# Patient Record
Sex: MALE | Race: WHITE | NOT HISPANIC OR LATINO | Employment: OTHER | URBAN - METROPOLITAN AREA
[De-identification: names, ages, dates, MRNs, and addresses within clinical notes are randomized per-mention and may not be internally consistent; named-entity substitution may affect disease eponyms.]

---

## 2022-07-18 NOTE — TELEPHONE ENCOUNTER
DIAGNOSIS: left knee pain   APPOINTMENT DATE: 07/19/2022   NOTES STATUS DETAILS   OFFICE NOTE from referring provider N/A    OFFICE NOTE from other specialist N/A    DISCHARGE SUMMARY from hospital N/A    DISCHARGE REPORT from the ER N/A    OPERATIVE REPORT N/A    EMG report N/A    MEDICATION LIST N/A    MRI N/A    DEXA (osteoporosis/bone health) N/A    CT SCAN N/A    XRAYS (IMAGES & REPORTS) N/A

## 2022-07-19 ENCOUNTER — ANCILLARY PROCEDURE (OUTPATIENT)
Dept: GENERAL RADIOLOGY | Facility: CLINIC | Age: 42
End: 2022-07-19
Attending: PREVENTIVE MEDICINE
Payer: COMMERCIAL

## 2022-07-19 ENCOUNTER — PRE VISIT (OUTPATIENT)
Dept: ORTHOPEDICS | Facility: CLINIC | Age: 42
End: 2022-07-19

## 2022-07-19 ENCOUNTER — OFFICE VISIT (OUTPATIENT)
Dept: ORTHOPEDICS | Facility: CLINIC | Age: 42
End: 2022-07-19
Payer: COMMERCIAL

## 2022-07-19 DIAGNOSIS — M54.16 LUMBAR RADICULAR PAIN: ICD-10-CM

## 2022-07-19 DIAGNOSIS — M25.562 LEFT KNEE PAIN: ICD-10-CM

## 2022-07-19 DIAGNOSIS — M17.12 ARTHRITIS OF LEFT KNEE: ICD-10-CM

## 2022-07-19 DIAGNOSIS — M25.562 LEFT KNEE PAIN, UNSPECIFIED CHRONICITY: Primary | ICD-10-CM

## 2022-07-19 PROCEDURE — 73562 X-RAY EXAM OF KNEE 3: CPT | Mod: LT | Performed by: RADIOLOGY

## 2022-07-19 PROCEDURE — 99204 OFFICE O/P NEW MOD 45 MIN: CPT | Performed by: PREVENTIVE MEDICINE

## 2022-07-19 NOTE — LETTER
"    7/19/2022         RE: David Segura  [address] [facility] [address]        Dear Colleague,    Thank you for referring your patient, David Segura, to the [facility] [place]. Please see a copy of my visit note below.    HISTORY OF PRESENT ILLNESS  Mr. Segura is a pleasant 42 year old year old male who presents to clinic today with   David explains that he feels like his knee is \"pulling\" when he flexes it and it has been painful for him  Location: left knee 3  Quality:  achy pain    Severity: 7/10 at worst        MEDICAL HISTORY  There is no problem list on file for this patient.      Current Outpatient Medications   Medication Sig Dispense Refill     ASPIRIN PO Take 324 mg by mouth         No Known Allergies    Family History   Problem Relation Age of Onset     C.A.D. Paternal Grandfather         MI age 50s     Prostate Cancer Paternal Grandfather         ?     Prostate Cancer Maternal Grandfather      Cancer - colorectal No family hx of      Social History     Socioeconomic History     Marital status:      Number of children: 1   Occupational History     Employer: [employer]   Tobacco Use     Smoking status: Never Smoker   Substance and Sexual Activity     Alcohol use: No     Drug use: No     Sexual activity: Yes     Partners: Female       Additional medical/Social/Surgical histories reviewed in [employer] and updated as appropriate.     REVIEW OF SYSTEMS (7/19/2022)  10 point ROS of systems including Constitutional, Eyes, Respiratory, Cardiovascular, Gastroenterology, Genitourinary, Integumentary, Musculoskeletal, Psychiatric, Allergic/Immunologic were all negative except for pertinent positives noted in my HPI.     PHYSICAL EXAM  There were no vitals filed for this visit.    ASSESSMENT & PLAN      I independently reviewed the following imaging studies:    Appropriate PPE was utilized for prevention of spread of Covid-19.  Carlos Garcia MD, " CAM          Again, thank you for allowing me to participate in the care of your patient.        Sincerely,        Carlos Garcia MD

## 2022-07-19 NOTE — PROGRESS NOTES
"HISTORY OF PRESENT ILLNESS  Mr. Segura is a pleasant 42 year old year old male who presents to clinic today with   David explains that he feels like his knee is \"pulling\" when he flexes it and it has been painful for him  Location: left knee 3  Quality:  achy pain    Severity: 7/10 at worst        MEDICAL HISTORY  There is no problem list on file for this patient.      Current Outpatient Medications   Medication Sig Dispense Refill     ASPIRIN PO Take 324 mg by mouth         No Known Allergies    Family History   Problem Relation Age of Onset     C.A.D. Paternal Grandfather         MI age 50s     Prostate Cancer Paternal Grandfather         ?     Prostate Cancer Maternal Grandfather      Cancer - colorectal No family hx of      Social History     Socioeconomic History     Marital status:      Number of children: 1   Occupational History     Employer: SELF   Tobacco Use     Smoking status: Never Smoker   Substance and Sexual Activity     Alcohol use: No     Drug use: No     Sexual activity: Yes     Partners: Female       Additional medical/Social/Surgical histories reviewed in [facility] and updated as appropriate.     REVIEW OF SYSTEMS (7/19/2022)  10 point ROS of systems including Constitutional, Eyes, Respiratory, Cardiovascular, Gastroenterology, Genitourinary, Integumentary, Musculoskeletal, Psychiatric, Allergic/Immunologic were all negative except for pertinent positives noted in my HPI.     PHYSICAL EXAM  VSS  General  - normal appearance, in no obvious distress  HEENT  - conjunctivae not injected, moist mucous membranes, normocephalic/atraumatic head, ears normal appearance, no lesions, mouth normal appearance, no scars, normal dentition and teeth present  CV  - normal peripheral perfusion  Pulm  - normal respiratory pattern, non-labored  Musculoskeletal - lumbar spine  - stance: normal gait without limp, no obvious leg length discrepancy, normal heel and toe walk  - inspection: normal bone and joint " alignment, no obvious scoliosis  - palpation: no paravertebral or bony tenderness  - ROM: flexion only slightly exacerbates pain, normal extension, sidebending, rotation  - strength: lower extremities 5/5 in all planes  - special tests:  (-) straight leg raise  (-) slump test  Neuro  - patellar and Achilles DTRs 2+ bilaterally, no lower extremity sensory deficit throughout L5 distribution, grossly normal coordination, normal muscle tone  Skin  - no ecchymosis, erythema, warmth, or induration, no obvious rash  Psych  - interactive, appropriate, normal mood and affect  Left knee: shows some pain with PF compression, no joint line ttp  ASSESSMENT & PLAN  43 yo male with left knee with some arthritis PF arthritis, and some lumbar disc space narrowing with some radicular pain    I independently reviewed the following imaging studies:  Lumbar xrays: shows some ddd  xrays knee shows some arthritis  Consider injection  Given HEP  nsaids PRN  F/u 1-2months   Appropriate PPE was utilized for prevention of spread of Covid-19.  Carlos Garcia MD, CAQSM

## 2022-08-05 ENCOUNTER — OFFICE VISIT (OUTPATIENT)
Dept: FAMILY MEDICINE | Facility: OTHER | Age: 42
End: 2022-08-05
Payer: COMMERCIAL

## 2022-08-05 VITALS
HEIGHT: 71 IN | RESPIRATION RATE: 16 BRPM | HEART RATE: 60 BPM | TEMPERATURE: 97.6 F | WEIGHT: 212 LBS | OXYGEN SATURATION: 96 % | BODY MASS INDEX: 29.68 KG/M2

## 2022-08-05 DIAGNOSIS — G89.29 CHRONIC LEFT SHOULDER PAIN: ICD-10-CM

## 2022-08-05 DIAGNOSIS — Z13.220 SCREENING FOR HYPERLIPIDEMIA: ICD-10-CM

## 2022-08-05 DIAGNOSIS — Z00.00 HEALTH MAINTENANCE EXAMINATION: Primary | ICD-10-CM

## 2022-08-05 DIAGNOSIS — Z11.4 SCREENING FOR HIV (HUMAN IMMUNODEFICIENCY VIRUS): ICD-10-CM

## 2022-08-05 DIAGNOSIS — M25.512 CHRONIC LEFT SHOULDER PAIN: ICD-10-CM

## 2022-08-05 DIAGNOSIS — G47.33 OSA (OBSTRUCTIVE SLEEP APNEA): ICD-10-CM

## 2022-08-05 DIAGNOSIS — E78.2 MIXED HYPERLIPIDEMIA: ICD-10-CM

## 2022-08-05 DIAGNOSIS — Z11.59 NEED FOR HEPATITIS C SCREENING TEST: ICD-10-CM

## 2022-08-05 DIAGNOSIS — R73.03 PREDIABETES: ICD-10-CM

## 2022-08-05 LAB
CHOLEST SERPL-MCNC: 223 MG/DL
FASTING STATUS PATIENT QL REPORTED: YES
HBA1C MFR BLD: 5.6 % (ref 0–5.6)
HCV AB SERPL QL IA: NONREACTIVE
HDLC SERPL-MCNC: 40 MG/DL
HIV 1+2 AB+HIV1 P24 AG SERPL QL IA: NONREACTIVE
LDLC SERPL CALC-MCNC: 138 MG/DL
NONHDLC SERPL-MCNC: 183 MG/DL
TRIGL SERPL-MCNC: 227 MG/DL

## 2022-08-05 PROCEDURE — 87389 HIV-1 AG W/HIV-1&-2 AB AG IA: CPT | Performed by: STUDENT IN AN ORGANIZED HEALTH CARE EDUCATION/TRAINING PROGRAM

## 2022-08-05 PROCEDURE — 83036 HEMOGLOBIN GLYCOSYLATED A1C: CPT | Performed by: STUDENT IN AN ORGANIZED HEALTH CARE EDUCATION/TRAINING PROGRAM

## 2022-08-05 PROCEDURE — 99204 OFFICE O/P NEW MOD 45 MIN: CPT | Performed by: STUDENT IN AN ORGANIZED HEALTH CARE EDUCATION/TRAINING PROGRAM

## 2022-08-05 PROCEDURE — 86803 HEPATITIS C AB TEST: CPT | Performed by: STUDENT IN AN ORGANIZED HEALTH CARE EDUCATION/TRAINING PROGRAM

## 2022-08-05 PROCEDURE — 80061 LIPID PANEL: CPT | Performed by: STUDENT IN AN ORGANIZED HEALTH CARE EDUCATION/TRAINING PROGRAM

## 2022-08-05 PROCEDURE — 36415 COLL VENOUS BLD VENIPUNCTURE: CPT | Performed by: STUDENT IN AN ORGANIZED HEALTH CARE EDUCATION/TRAINING PROGRAM

## 2022-08-05 ASSESSMENT — PAIN SCALES - GENERAL: PAINLEVEL: NO PAIN (0)

## 2022-08-05 NOTE — PROGRESS NOTES
"    Assessment & Plan     Health maintenance examination  Screening for HIV (human immunodeficiency virus)  - HIV Antigen Antibody Combo  Need for hepatitis C screening test  - Hepatitis C Screen Reflex to HCV RNA Quant and Genotype  Screening for hyperlipidemia  Baseline labs ordered today.  Health maintenance reviewed.  No early colon cancer screening required.  Healthy lifestyle measures were discussed    POPEYE (obstructive sleep apnea)  Stable on CPAP    Mixed hyperlipidemia  - Lipid panel reflex to direct LDL Fasting    Chronic left shoulder pain  Continue with chiropractic follow-up, massage, and provided exercises today.  He does wish to visit with physical therapy which I think is fair.  I do think his trapezius muscles do tense up especially at the end of the day, patient is very busy and is very active.  - Physical Therapy Referral; Future    Prediabetes  Previously 5.71-year ago  - Hemoglobin A1c    I have spent 45 or more minutes face-to-face with the patient and coordinating care such as reviewing documentation, results, or having discussions over the phone.      MARISELA BRADFORD MD  [facility] REBECCA Hernandez is a 42 year old, presenting for the following health issues:  Establish Care      History of Present Illness       Reason for visit:  Check up    He eats 2-3 servings of fruits and vegetables daily.He consumes 0 sweetened beverage(s) daily.He exercises with enough effort to increase his heart rate 10 to 19 minutes per day.  He exercises with enough effort to increase his heart rate 3 or less days per week.   He is taking medications regularly.       Blood work       Review of Systems   Constitutional, HEENT, cardiovascular, pulmonary, gi and gu systems are negative, except as otherwise noted.      Objective    Pulse 60   Temp 97.6  F (36.4  C) (Temporal)   Resp 16   Ht 1.803 m (5' 11\")   Wt 96.2 kg (212 lb)   SpO2 96%   BMI 29.57 kg/m    Body mass index is " 29.57 kg/m .  Physical Exam  Vitals and nursing note reviewed.   Constitutional:       General: He is not in acute distress.     Appearance: Normal appearance. He is not ill-appearing, toxic-appearing or diaphoretic.   HENT:      Head: Normocephalic and atraumatic.      Right Ear: Tympanic membrane, ear canal and external ear normal. There is no impacted cerumen.      Left Ear: Tympanic membrane, ear canal and external ear normal. There is no impacted cerumen.      Nose: Nose normal. No congestion or rhinorrhea.      Mouth/Throat:      Mouth: Mucous membranes are moist.      Pharynx: Oropharynx is clear. No oropharyngeal exudate or posterior oropharyngeal erythema.   Eyes:      General: No scleral icterus.        Right eye: No discharge.         Left eye: No discharge.      Extraocular Movements: Extraocular movements intact.      Conjunctiva/sclera: Conjunctivae normal.      Pupils: Pupils are equal, round, and reactive to light.   Cardiovascular:      Rate and Rhythm: Normal rate and regular rhythm.      Heart sounds: No murmur heard.  Pulmonary:      Effort: Pulmonary effort is normal. No respiratory distress.      Breath sounds: Normal breath sounds. No stridor.   Abdominal:      General: There is no distension.      Palpations: Abdomen is soft.      Tenderness: There is no abdominal tenderness.      Hernia: No hernia is present.      Comments: Healed surgical abdominal scars   Musculoskeletal:         General: Normal range of motion.      Cervical back: Normal range of motion.      Right lower leg: No edema.      Left lower leg: No edema.   Lymphadenopathy:      Cervical: No cervical adenopathy.   Skin:     General: Skin is warm.   Neurological:      Mental Status: He is alert.   Psychiatric:         Mood and Affect: Mood normal.         Behavior: Behavior normal.         Thought Content: Thought content normal.         Judgment: Judgment normal.                      .  ..

## 2022-08-05 NOTE — PATIENT INSTRUCTIONS
TOP FIVE THINGS FOR HEALTHY LIVING:    -Keep active and moving in some way EVERY DAY (even a simple walk).  -Eat a healthy diet mainly plant based diet (80%) with lots of colors in your food (example: vegetable salad).  -Take some time for yourself every day to relax in some way (read a book).  -Keep a consistent bedtime as much as possible.  -Wear seatbelts and/or helmet every time you are driving or riding in a vehicle/ATV/motorcycle.           Preventive Health Recommendations  Male Ages 40 to 49    Yearly exam:             See your health care provider every year in order to  o   Review health changes.   o   Discuss preventive care.    o   Review your medicines if your doctor has prescribed any.  You should be tested each year for STDs (sexually transmitted diseases) if you re at risk.   Have a cholesterol test every 5 years.   Have a colonoscopy (test for colon cancer) if someone in your family has had colon cancer or polyps before age 50.   After age 45, have a diabetes test (fasting glucose). If you are at risk for diabetes, you should have this test every 3 years.    Talk with your health care provider about whether or not a prostate cancer screening test (PSA) is right for you.    Shots: Get a flu shot each year. Get a tetanus shot every 10 years.     Nutrition:  Eat at least 5 servings of fruits and vegetables daily.   Eat whole-grain bread, whole-wheat pasta and brown rice instead of white grains and rice.   Get adequate Calcium and Vitamin D.     Lifestyle  Exercise for at least 150 minutes a week (30 minutes a day, 5 days a week). This will help you control your weight and prevent disease.   Limit alcohol to one drink per day.   No smoking.   Wear sunscreen to prevent skin cancer.   See your dentist every six months for an exam and cleaning.

## 2022-08-21 ENCOUNTER — HEALTH MAINTENANCE LETTER (OUTPATIENT)
Age: 42
End: 2022-08-21

## 2022-11-21 ENCOUNTER — HEALTH MAINTENANCE LETTER (OUTPATIENT)
Age: 42
End: 2022-11-21

## 2023-09-17 ENCOUNTER — HEALTH MAINTENANCE LETTER (OUTPATIENT)
Age: 43
End: 2023-09-17

## 2024-11-10 ENCOUNTER — HEALTH MAINTENANCE LETTER (OUTPATIENT)
Age: 44
End: 2024-11-10

## 2025-02-11 ENCOUNTER — ANESTHESIA EVENT (OUTPATIENT)
Dept: SURGERY | Facility: CLINIC | Age: 45
End: 2025-02-11
Payer: COMMERCIAL

## 2025-02-11 ENCOUNTER — APPOINTMENT (OUTPATIENT)
Dept: GENERAL RADIOLOGY | Facility: CLINIC | Age: 45
End: 2025-02-11
Attending: STUDENT IN AN ORGANIZED HEALTH CARE EDUCATION/TRAINING PROGRAM
Payer: COMMERCIAL

## 2025-02-11 ENCOUNTER — APPOINTMENT (OUTPATIENT)
Dept: CT IMAGING | Facility: CLINIC | Age: 45
End: 2025-02-11
Attending: STUDENT IN AN ORGANIZED HEALTH CARE EDUCATION/TRAINING PROGRAM
Payer: COMMERCIAL

## 2025-02-11 ENCOUNTER — APPOINTMENT (OUTPATIENT)
Dept: GENERAL RADIOLOGY | Facility: CLINIC | Age: 45
End: 2025-02-11
Attending: ORTHOPAEDIC SURGERY
Payer: COMMERCIAL

## 2025-02-11 ENCOUNTER — TELEPHONE (OUTPATIENT)
Dept: ORTHOPEDICS | Facility: CLINIC | Age: 45
End: 2025-02-11
Payer: COMMERCIAL

## 2025-02-11 ENCOUNTER — ANESTHESIA (OUTPATIENT)
Dept: SURGERY | Facility: CLINIC | Age: 45
End: 2025-02-11
Payer: COMMERCIAL

## 2025-02-11 ENCOUNTER — HOSPITAL ENCOUNTER (OUTPATIENT)
Facility: CLINIC | Age: 45
Discharge: HOME | End: 2025-02-12
Attending: STUDENT IN AN ORGANIZED HEALTH CARE EDUCATION/TRAINING PROGRAM | Admitting: STUDENT IN AN ORGANIZED HEALTH CARE EDUCATION/TRAINING PROGRAM
Payer: COMMERCIAL

## 2025-02-11 DIAGNOSIS — S82.891A CLOSED FRACTURE OF RIGHT ANKLE, INITIAL ENCOUNTER: ICD-10-CM

## 2025-02-11 DIAGNOSIS — S82.891A CLOSED FRACTURE DISLOCATION OF RIGHT ANKLE, INITIAL ENCOUNTER: ICD-10-CM

## 2025-02-11 DIAGNOSIS — Z98.890 HISTORY OF ANKLE SURGERY: Primary | ICD-10-CM

## 2025-02-11 LAB
ANION GAP SERPL CALCULATED.3IONS-SCNC: 11 MMOL/L (ref 7–15)
ANION GAP SERPL CALCULATED.3IONS-SCNC: 9 MMOL/L (ref 7–15)
ATRIAL RATE - MUSE: 62 BPM
BASOPHILS # BLD AUTO: 0 10E3/UL (ref 0–0.2)
BASOPHILS NFR BLD AUTO: 0 %
BUN SERPL-MCNC: 17.5 MG/DL (ref 6–20)
BUN SERPL-MCNC: 22.3 MG/DL (ref 6–20)
CALCIUM SERPL-MCNC: 8.4 MG/DL (ref 8.8–10.4)
CALCIUM SERPL-MCNC: 8.4 MG/DL (ref 8.8–10.4)
CHLORIDE SERPL-SCNC: 103 MMOL/L (ref 98–107)
CHLORIDE SERPL-SCNC: 104 MMOL/L (ref 98–107)
CREAT SERPL-MCNC: 0.85 MG/DL (ref 0.67–1.17)
CREAT SERPL-MCNC: 0.99 MG/DL (ref 0.67–1.17)
DIASTOLIC BLOOD PRESSURE - MUSE: NORMAL MMHG
EGFRCR SERPLBLD CKD-EPI 2021: >90 ML/MIN/1.73M2
EGFRCR SERPLBLD CKD-EPI 2021: >90 ML/MIN/1.73M2
EOSINOPHIL # BLD AUTO: 0 10E3/UL (ref 0–0.7)
EOSINOPHIL NFR BLD AUTO: 0 %
ERYTHROCYTE [DISTWIDTH] IN BLOOD BY AUTOMATED COUNT: 13 % (ref 10–15)
GLUCOSE SERPL-MCNC: 119 MG/DL (ref 70–99)
GLUCOSE SERPL-MCNC: 119 MG/DL (ref 70–99)
HCO3 SERPL-SCNC: 23 MMOL/L (ref 22–29)
HCO3 SERPL-SCNC: 24 MMOL/L (ref 22–29)
HCT VFR BLD AUTO: 38.8 % (ref 40–53)
HGB BLD-MCNC: 13.1 G/DL (ref 13.3–17.7)
IMM GRANULOCYTES # BLD: 0 10E3/UL
IMM GRANULOCYTES NFR BLD: 0 %
INTERPRETATION ECG - MUSE: NORMAL
LYMPHOCYTES # BLD AUTO: 0.7 10E3/UL (ref 0.8–5.3)
LYMPHOCYTES NFR BLD AUTO: 7 %
MCH RBC QN AUTO: 29.8 PG (ref 26.5–33)
MCHC RBC AUTO-ENTMCNC: 33.8 G/DL (ref 31.5–36.5)
MCV RBC AUTO: 88 FL (ref 78–100)
MONOCYTES # BLD AUTO: 0.7 10E3/UL (ref 0–1.3)
MONOCYTES NFR BLD AUTO: 6 %
NEUTROPHILS # BLD AUTO: 9.3 10E3/UL (ref 1.6–8.3)
NEUTROPHILS NFR BLD AUTO: 86 %
NRBC # BLD AUTO: 0 10E3/UL
NRBC BLD AUTO-RTO: 0 /100
P AXIS - MUSE: 50 DEGREES
PLATELET # BLD AUTO: 233 10E3/UL (ref 150–450)
POTASSIUM SERPL-SCNC: 4.4 MMOL/L (ref 3.4–5.3)
POTASSIUM SERPL-SCNC: 4.8 MMOL/L (ref 3.4–5.3)
PR INTERVAL - MUSE: 148 MS
QRS DURATION - MUSE: 90 MS
QT - MUSE: 418 MS
QTC - MUSE: 424 MS
R AXIS - MUSE: 105 DEGREES
RBC # BLD AUTO: 4.39 10E6/UL (ref 4.4–5.9)
SODIUM SERPL-SCNC: 137 MMOL/L (ref 135–145)
SODIUM SERPL-SCNC: 137 MMOL/L (ref 135–145)
SYSTOLIC BLOOD PRESSURE - MUSE: NORMAL MMHG
T AXIS - MUSE: 43 DEGREES
VENTRICULAR RATE- MUSE: 62 BPM
WBC # BLD AUTO: 10.8 10E3/UL (ref 4–11)

## 2025-02-11 PROCEDURE — 250N000011 HC RX IP 250 OP 636: Mod: JZ | Performed by: STUDENT IN AN ORGANIZED HEALTH CARE EDUCATION/TRAINING PROGRAM

## 2025-02-11 PROCEDURE — 999N000065 XR ANKLE RIGHT 1 VIEW: Mod: RT,52

## 2025-02-11 PROCEDURE — G0378 HOSPITAL OBSERVATION PER HR: HCPCS

## 2025-02-11 PROCEDURE — C1769 GUIDE WIRE: HCPCS | Performed by: ORTHOPAEDIC SURGERY

## 2025-02-11 PROCEDURE — 85049 AUTOMATED PLATELET COUNT: CPT | Performed by: STUDENT IN AN ORGANIZED HEALTH CARE EDUCATION/TRAINING PROGRAM

## 2025-02-11 PROCEDURE — 258N000003 HC RX IP 258 OP 636: Performed by: NURSE ANESTHETIST, CERTIFIED REGISTERED

## 2025-02-11 PROCEDURE — C1713 ANCHOR/SCREW BN/BN,TIS/BN: HCPCS | Performed by: ORTHOPAEDIC SURGERY

## 2025-02-11 PROCEDURE — 360N000084 HC SURGERY LEVEL 4 W/ FLUORO, PER MIN: Performed by: ORTHOPAEDIC SURGERY

## 2025-02-11 PROCEDURE — 250N000013 HC RX MED GY IP 250 OP 250 PS 637: Performed by: ORTHOPAEDIC SURGERY

## 2025-02-11 PROCEDURE — 99292 CRITICAL CARE ADDL 30 MIN: CPT

## 2025-02-11 PROCEDURE — 250N000025 HC SEVOFLURANE, PER MIN: Performed by: ORTHOPAEDIC SURGERY

## 2025-02-11 PROCEDURE — 250N000009 HC RX 250: Performed by: NURSE ANESTHETIST, CERTIFIED REGISTERED

## 2025-02-11 PROCEDURE — 710N000010 HC RECOVERY PHASE 1, LEVEL 2, PER MIN: Performed by: ORTHOPAEDIC SURGERY

## 2025-02-11 PROCEDURE — 36415 COLL VENOUS BLD VENIPUNCTURE: CPT | Performed by: STUDENT IN AN ORGANIZED HEALTH CARE EDUCATION/TRAINING PROGRAM

## 2025-02-11 PROCEDURE — 96376 TX/PRO/DX INJ SAME DRUG ADON: CPT | Mod: 59

## 2025-02-11 PROCEDURE — 999N000157 HC STATISTIC RCP TIME EA 10 MIN

## 2025-02-11 PROCEDURE — 73700 CT LOWER EXTREMITY W/O DYE: CPT | Mod: RT

## 2025-02-11 PROCEDURE — 93005 ELECTROCARDIOGRAM TRACING: CPT

## 2025-02-11 PROCEDURE — 96375 TX/PRO/DX INJ NEW DRUG ADDON: CPT

## 2025-02-11 PROCEDURE — 99291 CRITICAL CARE FIRST HOUR: CPT | Mod: 25

## 2025-02-11 PROCEDURE — 258N000003 HC RX IP 258 OP 636: Performed by: PHYSICIAN ASSISTANT

## 2025-02-11 PROCEDURE — 99285 EMERGENCY DEPT VISIT HI MDM: CPT | Mod: 25 | Performed by: STUDENT IN AN ORGANIZED HEALTH CARE EDUCATION/TRAINING PROGRAM

## 2025-02-11 PROCEDURE — 82565 ASSAY OF CREATININE: CPT | Performed by: PHYSICIAN ASSISTANT

## 2025-02-11 PROCEDURE — 272N000001 HC OR GENERAL SUPPLY STERILE: Performed by: ORTHOPAEDIC SURGERY

## 2025-02-11 PROCEDURE — 271N000001 HC OR GENERAL SUPPLY NON-STERILE: Performed by: ORTHOPAEDIC SURGERY

## 2025-02-11 PROCEDURE — 36415 COLL VENOUS BLD VENIPUNCTURE: CPT | Performed by: PHYSICIAN ASSISTANT

## 2025-02-11 PROCEDURE — 999N000054 HC STATISTIC EKG NON-CHARGEABLE

## 2025-02-11 PROCEDURE — 250N000011 HC RX IP 250 OP 636: Performed by: NURSE ANESTHETIST, CERTIFIED REGISTERED

## 2025-02-11 PROCEDURE — 99205 OFFICE O/P NEW HI 60 MIN: CPT | Performed by: STUDENT IN AN ORGANIZED HEALTH CARE EDUCATION/TRAINING PROGRAM

## 2025-02-11 PROCEDURE — 250N000011 HC RX IP 250 OP 636: Performed by: ORTHOPAEDIC SURGERY

## 2025-02-11 PROCEDURE — 999N000179 XR SURGERY CARM FLUORO LESS THAN 5 MIN W STILLS

## 2025-02-11 PROCEDURE — 370N000017 HC ANESTHESIA TECHNICAL FEE, PER MIN: Performed by: ORTHOPAEDIC SURGERY

## 2025-02-11 PROCEDURE — 73600 X-RAY EXAM OF ANKLE: CPT | Mod: RT

## 2025-02-11 PROCEDURE — 250N000011 HC RX IP 250 OP 636: Performed by: PHYSICIAN ASSISTANT

## 2025-02-11 PROCEDURE — 82565 ASSAY OF CREATININE: CPT | Performed by: STUDENT IN AN ORGANIZED HEALTH CARE EDUCATION/TRAINING PROGRAM

## 2025-02-11 PROCEDURE — 250N000013 HC RX MED GY IP 250 OP 250 PS 637: Performed by: PHYSICIAN ASSISTANT

## 2025-02-11 PROCEDURE — 999N000065 XR ANKLE RIGHT G/E 3 VIEWS: Mod: RT

## 2025-02-11 PROCEDURE — 96374 THER/PROPH/DIAG INJ IV PUSH: CPT | Mod: 59

## 2025-02-11 PROCEDURE — 999N000141 HC STATISTIC PRE-PROCEDURE NURSING ASSESSMENT: Performed by: ORTHOPAEDIC SURGERY

## 2025-02-11 DEVICE — IMP SCR SYN CORTEX 3.5X12MM SELF TAP SS 204.812: Type: IMPLANTABLE DEVICE | Site: ANKLE | Status: FUNCTIONAL

## 2025-02-11 DEVICE — IMP SCR SYN CORTEX 3.5X22MM SELF TAP SS 204.822: Type: IMPLANTABLE DEVICE | Site: ANKLE | Status: FUNCTIONAL

## 2025-02-11 DEVICE — IMP SCR SYN CORTEX 3.5X16MM SELF TAP SS 204.816: Type: IMPLANTABLE DEVICE | Site: ANKLE | Status: FUNCTIONAL

## 2025-02-11 DEVICE — IMP SCR SYN CAN 4.0X50MM SHORT SS 207.650: Type: IMPLANTABLE DEVICE | Site: ANKLE | Status: FUNCTIONAL

## 2025-02-11 DEVICE — IMP PLATE SYN 1/3 TUBULAR 73MM 06H SS 241.36: Type: IMPLANTABLE DEVICE | Site: ANKLE | Status: FUNCTIONAL

## 2025-02-11 DEVICE — IMP WASHER SYN CAN SM 7.0MM 219.98: Type: IMPLANTABLE DEVICE | Site: ANKLE | Status: FUNCTIONAL

## 2025-02-11 DEVICE — IMP SCR SYN CORTEX 3.5X20MM SELF TAP SS 204.820: Type: IMPLANTABLE DEVICE | Site: ANKLE | Status: FUNCTIONAL

## 2025-02-11 DEVICE — IMP SCR SYN CORTEX 3.5X14MM SELF TAP SS 204.814: Type: IMPLANTABLE DEVICE | Site: ANKLE | Status: FUNCTIONAL

## 2025-02-11 DEVICE — IMP SCR SYN CAN 4.0X30MM LONG THRD SS 207.730: Type: IMPLANTABLE DEVICE | Site: ANKLE | Status: FUNCTIONAL

## 2025-02-11 RX ORDER — AMOXICILLIN 250 MG
2 CAPSULE ORAL 2 TIMES DAILY PRN
Status: DISCONTINUED | OUTPATIENT
Start: 2025-02-11 | End: 2025-02-12 | Stop reason: HOSPADM

## 2025-02-11 RX ORDER — HYDROMORPHONE HYDROCHLORIDE 1 MG/ML
0.5 INJECTION, SOLUTION INTRAMUSCULAR; INTRAVENOUS; SUBCUTANEOUS ONCE
Status: COMPLETED | OUTPATIENT
Start: 2025-02-11 | End: 2025-02-11

## 2025-02-11 RX ORDER — NALOXONE HYDROCHLORIDE 0.4 MG/ML
0.4 INJECTION, SOLUTION INTRAMUSCULAR; INTRAVENOUS; SUBCUTANEOUS
Status: DISCONTINUED | OUTPATIENT
Start: 2025-02-11 | End: 2025-02-12 | Stop reason: HOSPADM

## 2025-02-11 RX ORDER — NALOXONE HYDROCHLORIDE 0.4 MG/ML
0.1 INJECTION, SOLUTION INTRAMUSCULAR; INTRAVENOUS; SUBCUTANEOUS
Status: DISCONTINUED | OUTPATIENT
Start: 2025-02-11 | End: 2025-02-11 | Stop reason: HOSPADM

## 2025-02-11 RX ORDER — CEFAZOLIN SODIUM/WATER 2 G/20 ML
2 SYRINGE (ML) INTRAVENOUS SEE ADMIN INSTRUCTIONS
Status: DISCONTINUED | OUTPATIENT
Start: 2025-02-11 | End: 2025-02-11

## 2025-02-11 RX ORDER — ALBUTEROL SULFATE 90 UG/1
2 INHALANT RESPIRATORY (INHALATION) EVERY 4 HOURS PRN
Status: DISCONTINUED | OUTPATIENT
Start: 2025-02-11 | End: 2025-02-12 | Stop reason: HOSPADM

## 2025-02-11 RX ORDER — AMOXICILLIN 250 MG
1 CAPSULE ORAL 2 TIMES DAILY
Status: DISCONTINUED | OUTPATIENT
Start: 2025-02-11 | End: 2025-02-12 | Stop reason: HOSPADM

## 2025-02-11 RX ORDER — GLYCOPYRROLATE 0.2 MG/ML
INJECTION, SOLUTION INTRAMUSCULAR; INTRAVENOUS PRN
Status: DISCONTINUED | OUTPATIENT
Start: 2025-02-11 | End: 2025-02-11

## 2025-02-11 RX ORDER — ACETAMINOPHEN 325 MG/1
650 TABLET ORAL EVERY 4 HOURS PRN
Qty: 100 TABLET | Refills: 0 | Status: SHIPPED | OUTPATIENT
Start: 2025-02-11

## 2025-02-11 RX ORDER — PROPOFOL 10 MG/ML
200 INJECTION, EMULSION INTRAVENOUS ONCE
Status: COMPLETED | OUTPATIENT
Start: 2025-02-11 | End: 2025-02-11

## 2025-02-11 RX ORDER — FENTANYL CITRATE-0.9 % NACL/PF 10 MCG/ML
PLASTIC BAG, INJECTION (ML) INTRAVENOUS CONTINUOUS PRN
Status: DISCONTINUED | OUTPATIENT
Start: 2025-02-11 | End: 2025-02-11

## 2025-02-11 RX ORDER — CEFAZOLIN SODIUM/WATER 2 G/20 ML
2 SYRINGE (ML) INTRAVENOUS
Status: COMPLETED | OUTPATIENT
Start: 2025-02-11 | End: 2025-02-11

## 2025-02-11 RX ORDER — OXYCODONE HYDROCHLORIDE 5 MG/1
10 TABLET ORAL EVERY 4 HOURS PRN
Status: DISCONTINUED | OUTPATIENT
Start: 2025-02-11 | End: 2025-02-12 | Stop reason: HOSPADM

## 2025-02-11 RX ORDER — HYDROMORPHONE HCL IN WATER/PF 6 MG/30 ML
0.4 PATIENT CONTROLLED ANALGESIA SYRINGE INTRAVENOUS EVERY 5 MIN PRN
Status: DISCONTINUED | OUTPATIENT
Start: 2025-02-11 | End: 2025-02-11 | Stop reason: HOSPADM

## 2025-02-11 RX ORDER — HYDROMORPHONE HYDROCHLORIDE 1 MG/ML
0.5 INJECTION, SOLUTION INTRAMUSCULAR; INTRAVENOUS; SUBCUTANEOUS EVERY 4 HOURS PRN
Status: DISCONTINUED | OUTPATIENT
Start: 2025-02-11 | End: 2025-02-11

## 2025-02-11 RX ORDER — ONDANSETRON 2 MG/ML
4 INJECTION INTRAMUSCULAR; INTRAVENOUS EVERY 6 HOURS PRN
Status: DISCONTINUED | OUTPATIENT
Start: 2025-02-11 | End: 2025-02-12 | Stop reason: HOSPADM

## 2025-02-11 RX ORDER — DEXAMETHASONE SODIUM PHOSPHATE 4 MG/ML
INJECTION, SOLUTION INTRA-ARTICULAR; INTRALESIONAL; INTRAMUSCULAR; INTRAVENOUS; SOFT TISSUE PRN
Status: DISCONTINUED | OUTPATIENT
Start: 2025-02-11 | End: 2025-02-11

## 2025-02-11 RX ORDER — CEFAZOLIN SODIUM 2 G/50ML
2 SOLUTION INTRAVENOUS EVERY 8 HOURS
Status: COMPLETED | OUTPATIENT
Start: 2025-02-12 | End: 2025-02-12

## 2025-02-11 RX ORDER — BISACODYL 10 MG
10 SUPPOSITORY, RECTAL RECTAL DAILY PRN
Status: DISCONTINUED | OUTPATIENT
Start: 2025-02-11 | End: 2025-02-12 | Stop reason: HOSPADM

## 2025-02-11 RX ORDER — ONDANSETRON 2 MG/ML
4 INJECTION INTRAMUSCULAR; INTRAVENOUS EVERY 30 MIN PRN
Status: DISCONTINUED | OUTPATIENT
Start: 2025-02-11 | End: 2025-02-11 | Stop reason: HOSPADM

## 2025-02-11 RX ORDER — HYDROMORPHONE HCL IN WATER/PF 6 MG/30 ML
0.2 PATIENT CONTROLLED ANALGESIA SYRINGE INTRAVENOUS EVERY 5 MIN PRN
Status: DISCONTINUED | OUTPATIENT
Start: 2025-02-11 | End: 2025-02-11 | Stop reason: HOSPADM

## 2025-02-11 RX ORDER — NALOXONE HYDROCHLORIDE 0.4 MG/ML
0.2 INJECTION, SOLUTION INTRAMUSCULAR; INTRAVENOUS; SUBCUTANEOUS
Status: DISCONTINUED | OUTPATIENT
Start: 2025-02-11 | End: 2025-02-12 | Stop reason: HOSPADM

## 2025-02-11 RX ORDER — ASPIRIN 325 MG
325 TABLET, DELAYED RELEASE (ENTERIC COATED) ORAL DAILY
Qty: 30 TABLET | Refills: 0 | Status: SHIPPED | OUTPATIENT
Start: 2025-02-11

## 2025-02-11 RX ORDER — ALBUTEROL SULFATE 90 UG/1
2 INHALANT RESPIRATORY (INHALATION) EVERY 4 HOURS PRN
COMMUNITY

## 2025-02-11 RX ORDER — HYDROMORPHONE HCL IN WATER/PF 6 MG/30 ML
0.2 PATIENT CONTROLLED ANALGESIA SYRINGE INTRAVENOUS
Status: DISCONTINUED | OUTPATIENT
Start: 2025-02-11 | End: 2025-02-11

## 2025-02-11 RX ORDER — SODIUM CHLORIDE, SODIUM LACTATE, POTASSIUM CHLORIDE, CALCIUM CHLORIDE 600; 310; 30; 20 MG/100ML; MG/100ML; MG/100ML; MG/100ML
INJECTION, SOLUTION INTRAVENOUS CONTINUOUS
Status: DISCONTINUED | OUTPATIENT
Start: 2025-02-11 | End: 2025-02-11 | Stop reason: HOSPADM

## 2025-02-11 RX ORDER — OXYCODONE HYDROCHLORIDE 5 MG/1
5 TABLET ORAL EVERY 4 HOURS PRN
Status: DISCONTINUED | OUTPATIENT
Start: 2025-02-11 | End: 2025-02-11

## 2025-02-11 RX ORDER — ASPIRIN 325 MG
325 TABLET, DELAYED RELEASE (ENTERIC COATED) ORAL DAILY
Status: DISCONTINUED | OUTPATIENT
Start: 2025-02-12 | End: 2025-02-12 | Stop reason: HOSPADM

## 2025-02-11 RX ORDER — PROPOFOL 10 MG/ML
INJECTION, EMULSION INTRAVENOUS PRN
Status: DISCONTINUED | OUTPATIENT
Start: 2025-02-11 | End: 2025-02-11

## 2025-02-11 RX ORDER — LIDOCAINE HYDROCHLORIDE 20 MG/ML
INJECTION, SOLUTION INFILTRATION; PERINEURAL PRN
Status: DISCONTINUED | OUTPATIENT
Start: 2025-02-11 | End: 2025-02-11

## 2025-02-11 RX ORDER — BUPIVACAINE HYDROCHLORIDE 2.5 MG/ML
INJECTION, SOLUTION EPIDURAL; INFILTRATION; INTRACAUDAL PRN
Status: DISCONTINUED | OUTPATIENT
Start: 2025-02-11 | End: 2025-02-11

## 2025-02-11 RX ORDER — ACETAMINOPHEN 650 MG/1
650 SUPPOSITORY RECTAL EVERY 4 HOURS PRN
Status: DISCONTINUED | OUTPATIENT
Start: 2025-02-11 | End: 2025-02-12 | Stop reason: HOSPADM

## 2025-02-11 RX ORDER — POLYETHYLENE GLYCOL 3350 17 G/17G
17 POWDER, FOR SOLUTION ORAL DAILY
Status: DISCONTINUED | OUTPATIENT
Start: 2025-02-12 | End: 2025-02-12 | Stop reason: HOSPADM

## 2025-02-11 RX ORDER — AMOXICILLIN 250 MG
1-2 CAPSULE ORAL 2 TIMES DAILY
Qty: 30 TABLET | Refills: 1 | Status: SHIPPED | OUTPATIENT
Start: 2025-02-11

## 2025-02-11 RX ORDER — FENTANYL CITRATE 50 UG/ML
50 INJECTION, SOLUTION INTRAMUSCULAR; INTRAVENOUS EVERY 5 MIN PRN
Status: DISCONTINUED | OUTPATIENT
Start: 2025-02-11 | End: 2025-02-11 | Stop reason: HOSPADM

## 2025-02-11 RX ORDER — LIDOCAINE 40 MG/G
CREAM TOPICAL
Status: DISCONTINUED | OUTPATIENT
Start: 2025-02-11 | End: 2025-02-11

## 2025-02-11 RX ORDER — SODIUM CHLORIDE, SODIUM LACTATE, POTASSIUM CHLORIDE, CALCIUM CHLORIDE 600; 310; 30; 20 MG/100ML; MG/100ML; MG/100ML; MG/100ML
INJECTION, SOLUTION INTRAVENOUS CONTINUOUS
Status: DISCONTINUED | OUTPATIENT
Start: 2025-02-11 | End: 2025-02-12 | Stop reason: HOSPADM

## 2025-02-11 RX ORDER — ONDANSETRON 4 MG/1
4 TABLET, ORALLY DISINTEGRATING ORAL EVERY 6 HOURS PRN
Status: DISCONTINUED | OUTPATIENT
Start: 2025-02-11 | End: 2025-02-12 | Stop reason: HOSPADM

## 2025-02-11 RX ORDER — ONDANSETRON 4 MG/1
4 TABLET, ORALLY DISINTEGRATING ORAL EVERY 30 MIN PRN
Status: DISCONTINUED | OUTPATIENT
Start: 2025-02-11 | End: 2025-02-11 | Stop reason: HOSPADM

## 2025-02-11 RX ORDER — ACETAMINOPHEN 325 MG/1
975 TABLET ORAL EVERY 8 HOURS
Status: DISCONTINUED | OUTPATIENT
Start: 2025-02-12 | End: 2025-02-12 | Stop reason: HOSPADM

## 2025-02-11 RX ORDER — CEFAZOLIN SODIUM/WATER 2 G/20 ML
2 SYRINGE (ML) INTRAVENOUS
Status: DISCONTINUED | OUTPATIENT
Start: 2025-02-11 | End: 2025-02-11

## 2025-02-11 RX ORDER — ACETAMINOPHEN 325 MG/1
975 TABLET ORAL ONCE
Status: COMPLETED | OUTPATIENT
Start: 2025-02-11 | End: 2025-02-11

## 2025-02-11 RX ORDER — LIDOCAINE 40 MG/G
CREAM TOPICAL
Status: DISCONTINUED | OUTPATIENT
Start: 2025-02-11 | End: 2025-02-11 | Stop reason: HOSPADM

## 2025-02-11 RX ORDER — PROPOFOL 10 MG/ML
INJECTION, EMULSION INTRAVENOUS CONTINUOUS PRN
Status: DISCONTINUED | OUTPATIENT
Start: 2025-02-11 | End: 2025-02-11

## 2025-02-11 RX ORDER — CALCIUM CARBONATE 500 MG/1
1000 TABLET, CHEWABLE ORAL 4 TIMES DAILY PRN
Status: DISCONTINUED | OUTPATIENT
Start: 2025-02-11 | End: 2025-02-12 | Stop reason: HOSPADM

## 2025-02-11 RX ORDER — FENTANYL CITRATE 50 UG/ML
INJECTION, SOLUTION INTRAMUSCULAR; INTRAVENOUS PRN
Status: DISCONTINUED | OUTPATIENT
Start: 2025-02-11 | End: 2025-02-11

## 2025-02-11 RX ORDER — FENTANYL CITRATE 50 UG/ML
25 INJECTION, SOLUTION INTRAMUSCULAR; INTRAVENOUS EVERY 5 MIN PRN
Status: DISCONTINUED | OUTPATIENT
Start: 2025-02-11 | End: 2025-02-11 | Stop reason: HOSPADM

## 2025-02-11 RX ORDER — LIDOCAINE 40 MG/G
CREAM TOPICAL
Status: DISCONTINUED | OUTPATIENT
Start: 2025-02-11 | End: 2025-02-12 | Stop reason: HOSPADM

## 2025-02-11 RX ORDER — TRANEXAMIC ACID 650 MG/1
1950 TABLET ORAL ONCE
Status: COMPLETED | OUTPATIENT
Start: 2025-02-11 | End: 2025-02-11

## 2025-02-11 RX ORDER — HYDROMORPHONE HCL IN WATER/PF 6 MG/30 ML
0.4 PATIENT CONTROLLED ANALGESIA SYRINGE INTRAVENOUS
Status: DISCONTINUED | OUTPATIENT
Start: 2025-02-11 | End: 2025-02-11

## 2025-02-11 RX ORDER — AMOXICILLIN 250 MG
1 CAPSULE ORAL 2 TIMES DAILY PRN
Status: DISCONTINUED | OUTPATIENT
Start: 2025-02-11 | End: 2025-02-12 | Stop reason: HOSPADM

## 2025-02-11 RX ORDER — CEFAZOLIN SODIUM/WATER 2 G/20 ML
2 SYRINGE (ML) INTRAVENOUS SEE ADMIN INSTRUCTIONS
Status: DISCONTINUED | OUTPATIENT
Start: 2025-02-11 | End: 2025-02-11 | Stop reason: HOSPADM

## 2025-02-11 RX ORDER — CALCIUM CARBONATE 500 MG/1
1000 TABLET, CHEWABLE ORAL 4 TIMES DAILY PRN
Status: DISCONTINUED | OUTPATIENT
Start: 2025-02-11 | End: 2025-02-11

## 2025-02-11 RX ORDER — KETOROLAC TROMETHAMINE 15 MG/ML
15 INJECTION, SOLUTION INTRAMUSCULAR; INTRAVENOUS ONCE
Status: COMPLETED | OUTPATIENT
Start: 2025-02-11 | End: 2025-02-11

## 2025-02-11 RX ORDER — TRANEXAMIC ACID 650 MG/1
1950 TABLET ORAL ONCE
Status: DISCONTINUED | OUTPATIENT
Start: 2025-02-11 | End: 2025-02-11 | Stop reason: HOSPADM

## 2025-02-11 RX ORDER — HYDROMORPHONE HCL IN WATER/PF 6 MG/30 ML
0.2 PATIENT CONTROLLED ANALGESIA SYRINGE INTRAVENOUS
Status: DISCONTINUED | OUTPATIENT
Start: 2025-02-11 | End: 2025-02-12 | Stop reason: HOSPADM

## 2025-02-11 RX ORDER — HYDROMORPHONE HCL IN WATER/PF 6 MG/30 ML
0.4 PATIENT CONTROLLED ANALGESIA SYRINGE INTRAVENOUS
Status: DISCONTINUED | OUTPATIENT
Start: 2025-02-11 | End: 2025-02-12 | Stop reason: HOSPADM

## 2025-02-11 RX ORDER — BUPIVACAINE HYDROCHLORIDE AND EPINEPHRINE 5; 5 MG/ML; UG/ML
INJECTION, SOLUTION PERINEURAL PRN
Status: DISCONTINUED | OUTPATIENT
Start: 2025-02-11 | End: 2025-02-11

## 2025-02-11 RX ORDER — SODIUM CHLORIDE, SODIUM LACTATE, POTASSIUM CHLORIDE, CALCIUM CHLORIDE 600; 310; 30; 20 MG/100ML; MG/100ML; MG/100ML; MG/100ML
INJECTION, SOLUTION INTRAVENOUS CONTINUOUS PRN
Status: DISCONTINUED | OUTPATIENT
Start: 2025-02-11 | End: 2025-02-11

## 2025-02-11 RX ORDER — ACETAMINOPHEN 325 MG/1
650 TABLET ORAL EVERY 4 HOURS PRN
Status: DISCONTINUED | OUTPATIENT
Start: 2025-02-11 | End: 2025-02-12 | Stop reason: HOSPADM

## 2025-02-11 RX ORDER — OXYCODONE HYDROCHLORIDE 5 MG/1
5 TABLET ORAL EVERY 4 HOURS PRN
Status: DISCONTINUED | OUTPATIENT
Start: 2025-02-11 | End: 2025-02-12 | Stop reason: HOSPADM

## 2025-02-11 RX ORDER — HYDROXYZINE HYDROCHLORIDE 25 MG/1
25 TABLET, FILM COATED ORAL EVERY 6 HOURS PRN
Status: DISCONTINUED | OUTPATIENT
Start: 2025-02-11 | End: 2025-02-12 | Stop reason: HOSPADM

## 2025-02-11 RX ORDER — OXYCODONE HYDROCHLORIDE 5 MG/1
5-10 TABLET ORAL EVERY 4 HOURS PRN
Qty: 28 TABLET | Refills: 0 | Status: SHIPPED | OUTPATIENT
Start: 2025-02-11

## 2025-02-11 RX ORDER — CELECOXIB 100 MG/1
400 CAPSULE ORAL ONCE
Status: COMPLETED | OUTPATIENT
Start: 2025-02-11 | End: 2025-02-11

## 2025-02-11 RX ADMIN — Medication 2 G: at 17:09

## 2025-02-11 RX ADMIN — Medication 30 MCG/MIN: at 18:04

## 2025-02-11 RX ADMIN — TRANEXAMIC ACID 1950 MG: 650 TABLET ORAL at 15:48

## 2025-02-11 RX ADMIN — HYDROMORPHONE HYDROCHLORIDE 0.5 MG: 1 INJECTION, SOLUTION INTRAMUSCULAR; INTRAVENOUS; SUBCUTANEOUS at 07:45

## 2025-02-11 RX ADMIN — MIDAZOLAM 2 MG: 1 INJECTION INTRAMUSCULAR; INTRAVENOUS at 17:05

## 2025-02-11 RX ADMIN — SODIUM CHLORIDE, POTASSIUM CHLORIDE, SODIUM LACTATE AND CALCIUM CHLORIDE: 600; 310; 30; 20 INJECTION, SOLUTION INTRAVENOUS at 15:51

## 2025-02-11 RX ADMIN — PHENYLEPHRINE HYDROCHLORIDE 100 MCG: 10 INJECTION INTRAVENOUS at 17:22

## 2025-02-11 RX ADMIN — ROCURONIUM BROMIDE 50 MG: 50 INJECTION, SOLUTION INTRAVENOUS at 17:17

## 2025-02-11 RX ADMIN — PHENYLEPHRINE HYDROCHLORIDE 200 MCG: 10 INJECTION INTRAVENOUS at 17:28

## 2025-02-11 RX ADMIN — GLYCOPYRROLATE 0.2 MG: 0.2 INJECTION, SOLUTION INTRAMUSCULAR; INTRAVENOUS at 18:18

## 2025-02-11 RX ADMIN — PROPOFOL 90 MG: 10 INJECTION, EMULSION INTRAVENOUS at 09:10

## 2025-02-11 RX ADMIN — PROPOFOL 200 MG: 10 INJECTION, EMULSION INTRAVENOUS at 17:16

## 2025-02-11 RX ADMIN — DEXAMETHASONE SODIUM PHOSPHATE 4 MG: 4 INJECTION, SOLUTION INTRA-ARTICULAR; INTRALESIONAL; INTRAMUSCULAR; INTRAVENOUS; SOFT TISSUE at 17:46

## 2025-02-11 RX ADMIN — PHENYLEPHRINE HYDROCHLORIDE 200 MCG: 10 INJECTION INTRAVENOUS at 17:37

## 2025-02-11 RX ADMIN — SODIUM CHLORIDE, POTASSIUM CHLORIDE, SODIUM LACTATE AND CALCIUM CHLORIDE: 600; 310; 30; 20 INJECTION, SOLUTION INTRAVENOUS at 19:46

## 2025-02-11 RX ADMIN — FENTANYL CITRATE 50 MCG: 50 INJECTION INTRAMUSCULAR; INTRAVENOUS at 17:05

## 2025-02-11 RX ADMIN — HYDROMORPHONE HYDROCHLORIDE 0.5 MG: 1 INJECTION, SOLUTION INTRAMUSCULAR; INTRAVENOUS; SUBCUTANEOUS at 07:21

## 2025-02-11 RX ADMIN — LIDOCAINE HYDROCHLORIDE 50 MG: 20 INJECTION, SOLUTION INFILTRATION; PERINEURAL at 17:16

## 2025-02-11 RX ADMIN — KETOROLAC TROMETHAMINE 15 MG: 15 INJECTION, SOLUTION INTRAMUSCULAR; INTRAVENOUS at 07:50

## 2025-02-11 RX ADMIN — PROPOFOL 100 MCG/KG/MIN: 10 INJECTION, EMULSION INTRAVENOUS at 17:20

## 2025-02-11 RX ADMIN — CEFAZOLIN SODIUM 2 G: 2 SOLUTION INTRAVENOUS at 23:22

## 2025-02-11 RX ADMIN — BUPIVACAINE HYDROCHLORIDE 20 ML: 2.5 INJECTION, SOLUTION EPIDURAL; INFILTRATION; INTRACAUDAL at 20:05

## 2025-02-11 RX ADMIN — GLYCOPYRROLATE 0.2 MG: 0.2 INJECTION, SOLUTION INTRAMUSCULAR; INTRAVENOUS at 18:30

## 2025-02-11 RX ADMIN — BUPIVACAINE HYDROCHLORIDE AND EPINEPHRINE BITARTRATE 20 ML: 5; .005 INJECTION, SOLUTION PERINEURAL at 17:46

## 2025-02-11 RX ADMIN — DEXAMETHASONE SODIUM PHOSPHATE 4 MG: 4 INJECTION, SOLUTION INTRA-ARTICULAR; INTRALESIONAL; INTRAMUSCULAR; INTRAVENOUS; SOFT TISSUE at 20:05

## 2025-02-11 RX ADMIN — HYDROMORPHONE HYDROCHLORIDE 0.2 MG: 0.2 INJECTION, SOLUTION INTRAMUSCULAR; INTRAVENOUS; SUBCUTANEOUS at 13:08

## 2025-02-11 RX ADMIN — PHENYLEPHRINE HYDROCHLORIDE 100 MCG: 10 INJECTION INTRAVENOUS at 18:04

## 2025-02-11 RX ADMIN — ACETAMINOPHEN 975 MG: 325 TABLET ORAL at 15:47

## 2025-02-11 RX ADMIN — ACETAMINOPHEN 975 MG: 325 TABLET, FILM COATED ORAL at 23:21

## 2025-02-11 RX ADMIN — ONDANSETRON 4 MG: 2 INJECTION INTRAMUSCULAR; INTRAVENOUS at 19:34

## 2025-02-11 RX ADMIN — SODIUM CHLORIDE, POTASSIUM CHLORIDE, SODIUM LACTATE AND CALCIUM CHLORIDE: 600; 310; 30; 20 INJECTION, SOLUTION INTRAVENOUS at 21:39

## 2025-02-11 RX ADMIN — SODIUM CHLORIDE, POTASSIUM CHLORIDE, SODIUM LACTATE AND CALCIUM CHLORIDE: 600; 310; 30; 20 INJECTION, SOLUTION INTRAVENOUS at 17:09

## 2025-02-11 RX ADMIN — Medication 100 MG: at 20:06

## 2025-02-11 RX ADMIN — CELECOXIB 400 MG: 100 CAPSULE ORAL at 15:47

## 2025-02-11 RX ADMIN — PHENYLEPHRINE HYDROCHLORIDE 200 MCG: 10 INJECTION INTRAVENOUS at 17:47

## 2025-02-11 RX ADMIN — FENTANYL CITRATE 50 MCG: 50 INJECTION INTRAMUSCULAR; INTRAVENOUS at 20:06

## 2025-02-11 RX ADMIN — SENNOSIDES AND DOCUSATE SODIUM 1 TABLET: 50; 8.6 TABLET ORAL at 23:21

## 2025-02-11 RX ADMIN — PHENYLEPHRINE HYDROCHLORIDE 100 MCG: 10 INJECTION INTRAVENOUS at 17:34

## 2025-02-11 ASSESSMENT — ACTIVITIES OF DAILY LIVING (ADL)
ADLS_ACUITY_SCORE: 15
ADLS_ACUITY_SCORE: 20
ADLS_ACUITY_SCORE: 20
ADLS_ACUITY_SCORE: 15
ADLS_ACUITY_SCORE: 20
ADLS_ACUITY_SCORE: 41
ADLS_ACUITY_SCORE: 20
ADLS_ACUITY_SCORE: 20
ADLS_ACUITY_SCORE: 41
ADLS_ACUITY_SCORE: 20
ADLS_ACUITY_SCORE: 41
ADLS_ACUITY_SCORE: 41
ADLS_ACUITY_SCORE: 20
ADLS_ACUITY_SCORE: 20

## 2025-02-11 ASSESSMENT — COLUMBIA-SUICIDE SEVERITY RATING SCALE - C-SSRS
1. IN THE PAST MONTH, HAVE YOU WISHED YOU WERE DEAD OR WISHED YOU COULD GO TO SLEEP AND NOT WAKE UP?: NO
6. HAVE YOU EVER DONE ANYTHING, STARTED TO DO ANYTHING, OR PREPARED TO DO ANYTHING TO END YOUR LIFE?: NO
2. HAVE YOU ACTUALLY HAD ANY THOUGHTS OF KILLING YOURSELF IN THE PAST MONTH?: NO

## 2025-02-11 NOTE — ANESTHESIA PROCEDURE NOTES
Airway       Patient location during procedure: OR       Procedure Start/Stop Times: 2/11/2025 5:19 PM  Staff -        CRNA: Lucas Rocha APRN CRNA       Performed By: CRNA  Consent for Airway        Urgency: elective  Indications and Patient Condition       Indications for airway management: katelynn-procedural       Induction type:intravenous       Mask difficulty assessment: 1 - vent by mask    Final Airway Details       Final airway type: endotracheal airway       Successful airway: ETT - single  Endotracheal Airway Details        ETT size (mm): 7.5       Cuffed: yes       Successful intubation technique: video laryngoscopy       VL Blade Size: MAC 3       Grade View of Cords: 2       Adjucts: stylet       Position: Right       Measured from: lips       Secured at (cm): 22    Post intubation assessment        Placement verified by: capnometry, equal breath sounds and chest rise        Number of attempts at approach: 1       Number of other approaches attempted: 0       Secured with: plastic tape       Ease of procedure: easy       Dentition: Intact and Unchanged    Medication(s) Administered   Medication Administration Time: 2/11/2025 5:19 PM

## 2025-02-11 NOTE — MEDICATION SCRIBE - ADMISSION MEDICATION HISTORY
Medication Scribe Admission Medication History    Admission medication history is complete. The information provided in this note is only as accurate as the sources available at the time of the update.    Information Source(s): Patient and CareEverywhere/SureScripts via in-person    Pertinent Information: inpatient, wife present. Verified no use of pain pump, inhalers or prescription eye drops currently.    Changes made to PTA medication list:  Added: CPAP, albuterol inh  Deleted: aspirin  Changed: None    Allergies reviewed with patient and updates made in EHR: yes    Medication History Completed By: CARYL ROCA 2/11/2025 11:15 AM    PTA Med List   Medication Sig Note Last Dose/Taking    OTHER MEDICAL SUPPLIES at bedtime. 2/11/2025: Patient's own 2/10/2025 Bedtime    albuterol (PROAIR HFA/PROVENTIL HFA/VENTOLIN HFA) 108 (90 Base) MCG/ACT inhaler Inhale 2 puffs into the lungs every 4 hours as needed for shortness of breath, wheezing or cough. Only uses when sick  More than a month

## 2025-02-11 NOTE — ED PROVIDER NOTES
History     Chief Complaint   Patient presents with    Trauma     David Segura is a 44 year old male who presents the emergency department for right ankle pain after injury.  He slipped on the ice just prior to arrival, noted deformity.  He was not bleeding from anywhere.  No medications taken prior to arrival.  Did not hit his head.  This was an isolated injury.    Allergies:  No Known Allergies    Problem List:    Patient Active Problem List    Diagnosis Date Noted    Closed fracture of right ankle, initial encounter 02/11/2025     Priority: Medium    Closed fracture dislocation of right ankle, initial encounter 02/11/2025     Priority: Medium    Mixed hyperlipidemia 08/05/2022     Priority: Medium    Chronic left shoulder pain 08/05/2022     Priority: Medium    Prediabetes 08/05/2022     Priority: Medium    POPEYE (obstructive sleep apnea) 03/11/2014     Priority: Medium     AHI 77.3/RDI 77.8 desats 74% [place]          Past Medical History:    Past Medical History:   Diagnosis Date    POPEYE (obstructive sleep apnea)        Past Surgical History:    Past Surgical History:   Procedure Laterality Date     FLEX SIGMOIDOSCOPY W/WO ELAN SPEC BY BRUSH/WASH  2005    normal per patient    HERNIA REPAIR, INGUINAL RT/LT  2001    left       Family History:    Family History   Problem Relation Age of Onset    C.A.D. Paternal Grandfather         MI age 50s    Prostate Cancer Paternal Grandfather         ?    Prostate Cancer Maternal Grandfather     Cancer - colorectal No family hx of        Social History:  Marital Status:   [2]  Social History     Tobacco Use    Smoking status: Never    Smokeless tobacco: Never   Vaping Use    Vaping status: Never Used   Substance Use Topics    Alcohol use: No     Comment: minimal    Drug use: Never        Medications:    No current outpatient medications on file.        Review of Systems  Gen: No fevers, no unintentional weight changes  Head and neck: No headache, no neck  "pain  Eye: No eye pain, no vision changes  Respiratory: No shortness of breath, no cough  Cardiovascular: No chest pain, no palpitations  Abdominal: No vomiting, no constipation, no diarrhea  Urinary: No dysuria, no hematuria  Musculoskeletal: No joint redness,  Neurologic: No confusion, no weakness, no sensation changes  Psychiatric: No suicidal ideation, no homicidal ideation    Physical Exam   BP: 102/65  Pulse: 64  Temp: 97.3  F (36.3  C)  Resp: 18  Height: 180.3 cm (5' 11\")  Weight: 90.7 kg (200 lb)  SpO2: 100 %      Physical Exam  Gen: Awake, alert, no distress  Head: No gross lacerations, no appreciated irregularities of the skull  Face: No tenderness, no gross lesions, no bruising  Eyes: Conjunctiva clear,Pupils are equal round and reactive bilaterally  Ears: No gross evidence of external trauma, no gross blood, negative Jones sign bilaterally  Nose: Grossly midline, no bleeding, no septal hematoma, no CSF leak  Mouth: No appreciated lacerations or bruising, no tender or missing/fractured teeth, posterior pharynx open and clear  Neck: Trachea grossly midline, no bruising, no subcutaneous emphysema, no tenderness, no cervical spine tenderness, no c-collar  Chest: No gross bruising, symmetrical chest wall movement, no gross lesions, no tenderness, S1 and S2 cardiac sounds appreciated, lungs clear bilaterally, no respiratory distress  Abdomen: No gross lesions, no tenderness, no ecchymosis, no distention  Pelvis: Stable without tenderness  Right upper extremity: No joint or bony tenderness, painless range of motion, intact neurovascular exam, no skin lesions  Left upper extremity: No joint or bony tenderness, painless range of motion, intact neurovascular exam, no skin lesions  Right lower extremity: Deformed ankle laterally, diffuse tenderness surrounding the ankle, intact sensation and ability to wiggle his toes, could not appreciate a palpable pulse in the right foot, no appreciated skin lesions   Left " lower extremity: No joint or bony tenderness, painless range of motion, intact neurovascular exam, no skin lesions  Back: no midline tenderness, no skin lesions       ED Course        Procedures           Results for orders placed or performed during the hospital encounter of 02/11/25 (from the past 24 hours)   XR Ankle Right 2 Views    Narrative    EXAM: XR ANKLE RIGHT 2 VIEWS  LOCATION: [facility]  DATE: 2/11/2025    INDICATION: injury,  COMPARISON: None.      Impression    IMPRESSION:     The talus is dislocated laterally and posteriorly relative to the distal tibia. There is an acute appearing, comminuted and displaced fracture of the distal fibula with apex volar and valgus angulation. There are also acute, displaced fractures of the   medial and posterior malleoli.    XR Ankle Right 1 View    Narrative    EXAM: XR ANKLE RIGHT 1 VIEW  LOCATION: [facility]  DATE: 2/11/2025    INDICATION: Ankle pain. Fracture.  COMPARISON: 2/11/2025 radiographs obtained at 0743 hours.      Impression    IMPRESSION: Acute fracture-dislocation of the right ankle with moderately displaced fractures of the medial and lateral malleoli and lateral dislocation of the talus. No significant interval change since the prior study.   Ankle XR, G/E 3 views, right    Narrative    EXAM: XR ANKLE RIGHT G/E 3 VIEWS  LOCATION: [facility]  DATE: 2/11/2025    INDICATION: SP reduction.  COMPARISON: Same-day radiograph.      Impression    IMPRESSION: Portable single-view assisted/stress radiograph shows improved alignment of the tibiotalar dislocation. The medial malleolus and distal fibula/lateral malleolus fracture alignment also has improved. Soft tissue swelling.   Ankle XR, G/E 3 views, right    Narrative    EXAM: XR ANKLE RIGHT G/E 3 VIEWS  LOCATION: [facility]  DATE: 2/11/2025    INDICATION: sp reduction  COMPARISON:  12/11/2025      Impression    IMPRESSION: Interval reduction of the fracture dislocation of the right ankle with improved alignment from initial prereduction radiographs, though there is increased lateral translation of the talus relative to the tibial plafond when compared to   pre-splinting post reduction radiographs earlier today. Displaced medial malleolar fracture fragment within the medial clear space. Mildly impacted and moderately displaced oblique fracture of the distal fibula with extension to the distal tibiofibular   syndesmosis. Splinting material about the right ankle. Soft tissue swelling.   CBC with platelets differential    Narrative    The following orders were created for panel order CBC with platelets differential.  Procedure                               Abnormality         Status                     ---------                               -----------         ------                     CBC with platelets and d...[[id number]]  Abnormal            Final result                 Please view results for these tests on the individual orders.   Basic metabolic panel   Result Value Ref Range    Sodium 137 135 - 145 mmol/L    Potassium 4.8 3.4 - 5.3 mmol/L    Chloride 104 98 - 107 mmol/L    Carbon Dioxide (CO2) 24 22 - 29 mmol/L    Anion Gap 9 7 - 15 mmol/L    Urea Nitrogen 22.3 (H) 6.0 - 20.0 mg/dL    Creatinine 0.99 0.67 - 1.17 mg/dL    GFR Estimate >90 >60 mL/min/1.73m2    Calcium 8.4 (L) 8.8 - 10.4 mg/dL    Glucose 119 (H) 70 - 99 mg/dL   CBC with platelets and differential   Result Value Ref Range    WBC Count 10.8 4.0 - 11.0 10e3/uL    RBC Count 4.39 (L) 4.40 - 5.90 10e6/uL    Hemoglobin 13.1 (L) 13.3 - 17.7 g/dL    Hematocrit 38.8 (L) 40.0 - 53.0 %    MCV 88 78 - 100 fL    MCH 29.8 26.5 - 33.0 pg    MCHC 33.8 31.5 - 36.5 g/dL    RDW 13.0 10.0 - 15.0 %    Platelet Count 233 150 - 450 10e3/uL    % Neutrophils 86 %    % Lymphocytes 7 %    % Monocytes 6 %    % Eosinophils 0 %    % Basophils 0 %    %  Immature Granulocytes 0 %    NRBCs per 100 WBC 0 <1 /100    Absolute Neutrophils 9.3 (H) 1.6 - 8.3 10e3/uL    Absolute Lymphocytes 0.7 (L) 0.8 - 5.3 10e3/uL    Absolute Monocytes 0.7 0.0 - 1.3 10e3/uL    Absolute Eosinophils 0.0 0.0 - 0.7 10e3/uL    Absolute Basophils 0.0 0.0 - 0.2 10e3/uL    Absolute Immature Granulocytes 0.0 <=0.4 10e3/uL    Absolute NRBCs 0.0 10e3/uL   CT Ankle Right w/o Contrast    Narrative    EXAM: CT ANKLE RIGHT W/O CONTRAST  LOCATION: [facility]  DATE: 2/11/2025    INDICATION: fracture dislocation  COMPARISON: Radiograph 2/11/2025  TECHNIQUE: Noncontrast. Axial, sagittal and coronal thin-section reconstruction. Dose reduction techniques were used.     FINDINGS:     BONES:  -Reduced right ankle fracture dislocation. Mildly displaced posterior malleolus fracture with fracture fragment accounting for approximately 7 mm of the articular surface. There is 3 mm of posterior displacement and 2 mm of proximal displacement. There   are small adjacent fracture fragments. Displaced obliquely oriented fracture of the anteromedial tibia involving the medial metaphysis anteriorly and extending into the base of the medial malleolus. There is a 1 cm lateral displacement of the distal   fracture fragment which is distally displaced and rotated. There are small adjacent comminuted fracture fragments. Comminuted fracture of the distal fibula at the level of the tibial plafond with posterior and lateral displacement of the dominant distal   fracture fragment and apex anterior angulation. There is an additional posteriorly displaced butterfly fragment superiorly. Additional small comminuted fracture fragments are present.  -There is lateral and mild posterior subluxation of the talus at the tibiotalar joint.  -Small tibiotalar joint hemarthrosis which extravasates into the surrounding soft tissues.  -Joint spaces are otherwise preserved.  -Small type I accessory navicular  bone.  -Benign bone island in the medial cuneiform.    SOFT TISSUES:  -Nonorganized blood products and stranding about the fractures.  -The medial malleolus fracture fragment comes in close proximity to the anterior tibialis tendon which otherwise appears normal.  -Tendons are grossly normal. No gross tendinous entrapment.  -Muscles are grossly intrinsically normal.      Impression    IMPRESSION:  1.  Reduced right ankle fracture dislocation with mildly displaced posterior malleolus fracture, displaced obliquely oriented fracture of the anteromedial tibia involving the medial metaphysis and base of the medial malleolus, and comminuted and   displaced fracture of the distal fibula at the level of the tibial plafond.  2.  Persistent lateral and mild posterior subluxation of the talus at the tibiotalar joint.             Medications   lidocaine 1 % 0.1-1 mL (has no administration in time range)   lidocaine (LMX4) cream (has no administration in time range)   sodium chloride (PF) 0.9% PF flush 3 mL (3 mLs Intracatheter $Given 2/11/25 1308)   sodium chloride (PF) 0.9% PF flush 3 mL (has no administration in time range)   senna-docusate (SENOKOT-S/PERICOLACE) 8.6-50 MG per tablet 1 tablet (has no administration in time range)     Or   senna-docusate (SENOKOT-S/PERICOLACE) 8.6-50 MG per tablet 2 tablet (has no administration in time range)   calcium carbonate (TUMS) chewable tablet 1,000 mg (has no administration in time range)   acetaminophen (TYLENOL) tablet 650 mg (has no administration in time range)     Or   acetaminophen (TYLENOL) Suppository 650 mg (has no administration in time range)   HYDROmorphone (DILAUDID) injection 0.2 mg (0.2 mg Intravenous $Given 2/11/25 1308)   HYDROmorphone (DILAUDID) injection 0.4 mg (has no administration in time range)   naloxone (NARCAN) injection 0.2 mg (has no administration in time range)     Or   naloxone (NARCAN) injection 0.4 mg (has no administration in time range)     Or    naloxone (NARCAN) injection 0.2 mg (has no administration in time range)     Or   naloxone (NARCAN) injection 0.4 mg (has no administration in time range)   HYDROmorphone (PF) (DILAUDID) injection 0.5 mg (0.5 mg Intravenous $Given 2/11/25 0724)   HYDROmorphone (PF) (DILAUDID) injection 0.5 mg (0.5 mg Intravenous $Given 2/11/25 9865)   ketorolac (TORADOL) injection 15 mg (15 mg Intravenous $Given 2/11/25 5810)   propofol (DIPRIVAN) injection 10 mg/mL vial (90 mg Intravenous $Given 2/11/25 0910)       Assessments & Plan (with Medical Decision Making)   Right foot, did get a chance to give 0.5 Dilaudid.  Did not have time for formal x-ray and conscious sedation given the pulselessness.  A brief consent was taken place.  I did manually manipulate it with improvement of the alignment and now can appreciate a strong pulse.    His ankle is very unstable.  It seems to be well aligned when holding it in place tightly, however when even briefly taking off pressure it seems to dislocate.  We did perform a formal moderate sedation and reduction after consent was obtained.  While holding the extremity with my hands and x-ray was shot and showed a relatively reassuring position of the joint however with several people in the room, holding the joint in place and in the process of putting on the splint it is unstable enough and it did fall out of place.  With the current and most updated x-ray I discussed this case again with the orthopedic team, they will opt for acute surgical intervention of this.  Patient was admitted to the hospitalist service, remains n.p.o., pain medications as needed.  During the rest of his time in the ER he does have a palpable pulse and intact neurovascular exam of the right lower extremity.    I have reviewed the nursing notes.    I have reviewed the findings, diagnosis, plan and need for follow up with the patient.  Vital signs are reassuring.  Could not appreciate a pulse in the  deformed        Current Discharge Medication List          Final diagnoses:   Closed fracture of right ankle, initial encounter       2/11/2025   [facility] EMERGENCY DEPT       Norman Booth MD  02/11/25 1518

## 2025-02-11 NOTE — H&P
[facility]    History and Physical - Hospitalist Service       Date of Admission:  2/11/2025    Assessment & Plan      David Segura is a 44 year old male, with a history of obstructive sleep apnea, on CPAP, prediabetes, BMI of 28, who presented to the emergency room after a slip on ice, right ankle pain after injury.  Patient was found to have an acute fracture/dislocation of the right ankle.  Right ankle fracture dislocation was reduced in the emergency room and patient was admitted admitted on 2/11/2025 for right ankle fracture surgery.    Right ankle fracture  CT of the right ankle shows a reduced right ankle fracture dislocation with mildly displaced posterior malleolus fracture, displaced obliquely oriented fracture of the anterior medial tibia involving the medial metaphysis and the base of the medial malleolus and comminuted and displaced fracture of the distal fibula  - Surgery consulted  - Pain medicine regimen ordered  - N.p.o.      Obstructive sleep apnea  - Ordered CPAP    Prediabetes, hemoglobin A1c 6.0 in June 2024    Preop clearance:  No current chest pain, dyspnea or shortness of breath.  Patient does have a history of an NSTEMI on 12 10/20/2015.  Had a normal cath on 11/20/2015 with no obstruction.  And normal echocardiogram on 11/20/2015 with normal left ventricular systolic function and no wall motion abnormalities.  The NSTEMI was deemed to be stress-induced.  Revised cardiac risk index is 0.  Baseline 3.9% risk of cardiac events.  Patient is cleared for surgery.  --EKG ordered    BMI 28      Diet: NPO per Anesthesia Guidelines for Procedure/Surgery Except for: Meds    DVT Prophylaxis: Pneumatic Compression Devices  Graham Catheter: Not present  Lines: None     Cardiac Monitoring: None  Code Status: Full Code    Clinically Significant Risk Factors Present on Admission                             # Overweight: Estimated body mass index is 28.04 kg/m  as  "calculated from the following:    Height as of this encounter: 1.803 m (5' 11\").    Weight as of this encounter: 91.2 kg (201 lb 1 oz).              Disposition Plan     Medically Ready for Discharge: Today versus tomorrow, as per surgery           Kayley Blair MD  Hospitalist Service  [facility]  Securely message with [employer] (more info)  Text page via [facility] Paging/Directory     ______________________________________________________________________    Chief Complaint   Fall on ice, ankle fracture    History is obtained from the patient    History of Present Illness   David Segura is a 44 year old male, with a history of obstructive sleep apnea, who slid on ice, foot got caught,  and ankle was twisted.  Patient denies any other concerns.  Denies chest pain or shortness of breath.  He does not smoke, does not vape, rare alcohol use.  He does have a history of a stress-induced NSTEMI in 2015, within normal cath with no obstruction and a normal echocardiogram with normal left ventricular systolic function.  The ankle pain is 3 out of 10, well-controlled with medications.      Past Medical History    Past Medical History:   Diagnosis Date    POPEYE (obstructive sleep apnea)        Past Surgical History   Past Surgical History:   Procedure Laterality Date    HC FLEX SIGMOIDOSCOPY W/WO ELAN SPEC BY BRUSH/WASH  2005    normal per patient    HERNIA REPAIR, INGUINAL RT/LT  2001    left       Prior to Admission Medications   Prior to Admission Medications   Prescriptions Last Dose Informant Patient Reported? Taking?   OTHER MEDICAL SUPPLIES 2/10/2025 Bedtime  Yes Yes   Sig: at bedtime.   albuterol (PROAIR HFA/PROVENTIL HFA/VENTOLIN HFA) 108 (90 Base) MCG/ACT inhaler More than a month  Yes Yes   Sig: Inhale 2 puffs into the lungs every 4 hours as needed for shortness of breath, wheezing or cough. Only uses when sick      Facility-Administered Medications: None        Review of Systems  "   CONSTITUTIONAL: NEGATIVE for fever, chills, change in weight  ENT/MOUTH: NEGATIVE for ear, mouth and throat problems  RESP: NEGATIVE for significant cough or SOB  CV: NEGATIVE for chest pain, palpitations or peripheral edema    Social History   I have reviewed this patient's social history and updated it with pertinent information if needed.  Social History     Tobacco Use    Smoking status: Never    Smokeless tobacco: Never   Vaping Use    Vaping status: Never Used   Substance Use Topics    Alcohol use: No     Comment: minimal    Drug use: Never         Family History   I have reviewed this patient's family history and updated it with pertinent information if needed.  Family History   Problem Relation Age of Onset    C.A.D. Paternal Grandfather         MI age 50s    Prostate Cancer Paternal Grandfather         ?    Prostate Cancer Maternal Grandfather     Cancer - colorectal No family hx of          Allergies   No Known Allergies     Physical Exam   Vital Signs: Temp: 98  F (36.7  C) Temp src: Oral BP: 118/66 Pulse: 60   Resp: 12 SpO2: 98 % O2 Device: None (Room air) Oxygen Delivery: 3 LPM  Weight: 201 lbs .95 oz    Constitutional: awake, alert, cooperative, no apparent distress, and appears stated age  Eyes: Lids and lashes normal, pupils equal, round and reactive to light, extra ocular muscles intact, sclera clear, conjunctiva normal  Respiratory: No increased work of breathing, good air exchange, clear to auscultation bilaterally, no crackles or wheezing  Cardiovascular: Normal apical impulse, regular rate and rhythm, normal S1 and S2, no S3 or S4, and no murmur noted  GI: normal bowel sounds, soft, non-distended, non-tender, no masses palpated, no hepatosplenomegally  Skin: no bruising or bleeding  Musculoskeletal: Right ankle is in splint/Ace wrap    Medical Decision Making       60 MINUTES SPENT BY ME on the date of service doing chart review, history, exam, documentation & further activities per the note.       Data   ------------------------- PAST 24 HR DATA REVIEWED -----------------------------------------------    I have personally reviewed the following data over the past 24 hrs:    10.8  \   13.1 (L)   / 233     137 104 22.3 (H) /  119 (H)   4.8 24 0.99 \       Imaging results reviewed over the past 24 hrs:   Recent Results (from the past 24 hours)   XR Ankle Right 2 Views    Narrative    EXAM: XR ANKLE RIGHT 2 VIEWS  LOCATION: [facility]  DATE: 2/11/2025    INDICATION: injury,  COMPARISON: None.      Impression    IMPRESSION:     The talus is dislocated laterally and posteriorly relative to the distal tibia. There is an acute appearing, comminuted and displaced fracture of the distal fibula with apex volar and valgus angulation. There are also acute, displaced fractures of the   medial and posterior malleoli.    XR Ankle Right 1 View    Narrative    EXAM: XR ANKLE RIGHT 1 VIEW  LOCATION: [facility]  DATE: 2/11/2025    INDICATION: Ankle pain. Fracture.  COMPARISON: 2/11/2025 radiographs obtained at 0743 hours.      Impression    IMPRESSION: Acute fracture-dislocation of the right ankle with moderately displaced fractures of the medial and lateral malleoli and lateral dislocation of the talus. No significant interval change since the prior study.   Ankle XR, G/E 3 views, right    Narrative    EXAM: XR ANKLE RIGHT G/E 3 VIEWS  LOCATION: [facility]  DATE: 2/11/2025    INDICATION: SP reduction.  COMPARISON: Same-day radiograph.      Impression    IMPRESSION: Portable single-view assisted/stress radiograph shows improved alignment of the tibiotalar dislocation. The medial malleolus and distal fibula/lateral malleolus fracture alignment also has improved. Soft tissue swelling.   Ankle XR, G/E 3 views, right    Narrative    EXAM: XR ANKLE RIGHT G/E 3 VIEWS  LOCATION: [facility]  DATE:  2/11/2025    INDICATION: sp reduction  COMPARISON: 12/11/2025      Impression    IMPRESSION: Interval reduction of the fracture dislocation of the right ankle with improved alignment from initial prereduction radiographs, though there is increased lateral translation of the talus relative to the tibial plafond when compared to   pre-splinting post reduction radiographs earlier today. Displaced medial malleolar fracture fragment within the medial clear space. Mildly impacted and moderately displaced oblique fracture of the distal fibula with extension to the distal tibiofibular   syndesmosis. Splinting material about the right ankle. Soft tissue swelling.   CT Ankle Right w/o Contrast    Narrative    EXAM: CT ANKLE RIGHT W/O CONTRAST  LOCATION: [facility]  DATE: 2/11/2025    INDICATION: fracture dislocation  COMPARISON: Radiograph 2/11/2025  TECHNIQUE: Noncontrast. Axial, sagittal and coronal thin-section reconstruction. Dose reduction techniques were used.     FINDINGS:     BONES:  -Reduced right ankle fracture dislocation. Mildly displaced posterior malleolus fracture with fracture fragment accounting for approximately 7 mm of the articular surface. There is 3 mm of posterior displacement and 2 mm of proximal displacement. There   are small adjacent fracture fragments. Displaced obliquely oriented fracture of the anteromedial tibia involving the medial metaphysis anteriorly and extending into the base of the medial malleolus. There is a 1 cm lateral displacement of the distal   fracture fragment which is distally displaced and rotated. There are small adjacent comminuted fracture fragments. Comminuted fracture of the distal fibula at the level of the tibial plafond with posterior and lateral displacement of the dominant distal   fracture fragment and apex anterior angulation. There is an additional posteriorly displaced butterfly fragment superiorly. Additional small comminuted  fracture fragments are present.  -There is lateral and mild posterior subluxation of the talus at the tibiotalar joint.  -Small tibiotalar joint hemarthrosis which extravasates into the surrounding soft tissues.  -Joint spaces are otherwise preserved.  -Small type I accessory navicular bone.  -Benign bone island in the medial cuneiform.    SOFT TISSUES:  -Nonorganized blood products and stranding about the fractures.  -The medial malleolus fracture fragment comes in close proximity to the anterior tibialis tendon which otherwise appears normal.  -Tendons are grossly normal. No gross tendinous entrapment.  -Muscles are grossly intrinsically normal.      Impression    IMPRESSION:  1.  Reduced right ankle fracture dislocation with mildly displaced posterior malleolus fracture, displaced obliquely oriented fracture of the anteromedial tibia involving the medial metaphysis and base of the medial malleolus, and comminuted and   displaced fracture of the distal fibula at the level of the tibial plafond.  2.  Persistent lateral and mild posterior subluxation of the talus at the tibiotalar joint.

## 2025-02-11 NOTE — ED TRIAGE NOTES
He slipped on the ice and his R ankle is now deformed and very painful     Triage Assessment (Adult)       Row Name 02/11/25 0718          Triage Assessment    Airway WDL WDL        Respiratory WDL    Respiratory WDL WDL        Skin Circulation/Temperature WDL    Skin Circulation/Temperature WDL X;all  deformed R ankle        Cognitive/Neuro/Behavioral WDL    Cognitive/Neuro/Behavioral WDL WDL

## 2025-02-11 NOTE — ANESTHESIA PREPROCEDURE EVALUATION
Anesthesia Pre-Procedure Evaluation    Patient: David Segura   MRN: [id number] : 1980        Procedure : Procedure(s):  OPEN REDUCTION INTERNAL FIXATION, FRACTURE, ANKLE          Past Medical History:   Diagnosis Date     POPEYE (obstructive sleep apnea)       Past Surgical History:   Procedure Laterality Date     HC FLEX SIGMOIDOSCOPY W/WO ELAN SPEC BY BRUSH/WASH      normal per patient     HERNIA REPAIR, INGUINAL RT/LT      left      No Known Allergies   Social History     Tobacco Use     Smoking status: Never     Smokeless tobacco: Never   Substance Use Topics     Alcohol use: No     Comment: minimal      Wt Readings from Last 1 Encounters:   25 91.2 kg (201 lb 1 oz)        Anesthesia Evaluation   Pt has had prior anesthetic. Type: General.    No history of anesthetic complications       ROS/MED HX  ENT/Pulmonary:     (+) sleep apnea, moderate, uses CPAP,                   Intermittent, asthma  Treatment: Inhaler prn,                 Neurologic:  - neg neurologic ROS     Cardiovascular:     (+) Dyslipidemia - -   -  - -                                      METS/Exercise Tolerance:     Hematologic:  - neg hematologic  ROS     Musculoskeletal:   (+)     fracture, Fracture location: RLE,         GI/Hepatic:  - neg GI/hepatic ROS     Renal/Genitourinary:  - neg Renal ROS     Endo:  - neg endo ROS     Psychiatric/Substance Use:  - neg psychiatric ROS     Infectious Disease:  - neg infectious disease ROS     Malignancy:  - neg malignancy ROS     Other:  - neg other ROS          Physical Exam    Airway        Mallampati: II   TM distance: > 3 FB   Neck ROM: full   Mouth opening: > 3 cm    Respiratory Devices and Support         Dental       (+) Minor Abnormalities - some fillings, tiny chips      Cardiovascular             Pulmonary               OUTSIDE LABS:  CBC:   Lab Results   Component Value Date    WBC 10.8 2025    HGB 13.1 (L) 2025    HCT 38.8 (L) 2025      "02/11/2025     BMP:   Lab Results   Component Value Date     02/11/2025    POTASSIUM 4.8 02/11/2025    CHLORIDE 104 02/11/2025    CO2 24 02/11/2025    BUN 22.3 (H) 02/11/2025    CR 0.99 02/11/2025     (H) 02/11/2025     COAGS: No results found for: \"PTT\", \"INR\", \"FIBR\"  POC: No results found for: \"BGM\", \"HCG\", \"HCGS\"  HEPATIC: No results found for: \"ALBUMIN\", \"PROTTOTAL\", \"ALT\", \"AST\", \"GGT\", \"ALKPHOS\", \"BILITOTAL\", \"BILIDIRECT\", \"SUSAN\"  OTHER:   Lab Results   Component Value Date    A1C 5.6 08/05/2022    SARAH 8.4 (L) 02/11/2025       Anesthesia Plan    ASA Status:  2, emergent    NPO Status:  NPO Appropriate    Anesthesia Type: General.     - Airway: LMA   Induction: Intravenous.   Maintenance: Balanced.        Consents    Anesthesia Plan(s) and associated risks, benefits, and realistic alternatives discussed. Questions answered and patient/representative(s) expressed understanding.     - Discussed: Risks, Benefits and Alternatives for BOTH SEDATION and the PROCEDURE were discussed     - Discussed with:  Patient      - Extended Intubation/Ventilatory Support Discussed: No.      - Patient is DNR/DNI Status: No     Use of blood products discussed: No .     Postoperative Care    Pain management: IV analgesics, Peripheral nerve block (Single Shot).   PONV prophylaxis: Dexamethasone or Solumedrol, Ondansetron (or other 5HT-3), Background Propofol Infusion     Comments:    Other Comments: The risks and benefits of anesthesia, and the alternatives where applicable, have been discussed with the patient, and they wish to proceed.     Popliteal and adductor canal nerve block          ANITA Salcido CRNA    I have reviewed the pertinent notes and labs in the chart from the past 30 days and (re)examined the patient.  Any updates or changes from those notes are reflected in this note.    Clinically Significant Risk Factors Present on Admission                             # Overweight: Estimated body mass " "index is 28.04 kg/m  as calculated from the following:    Height as of this encounter: 1.803 m (5' 11\").    Weight as of this encounter: 91.2 kg (201 lb 1 oz).                "

## 2025-02-11 NOTE — SEDATION DOCUMENTATION
Propofol administered.  Provider reduced ankle then splinted.  Xray completed just prior to splinting.

## 2025-02-11 NOTE — PROGRESS NOTES
Posterior Vitreous Detachment (PVD) Counseling: A posterior vitreous detachment (PVD) is a condition of the eye in which the vitreous membrane separates from the retina. I have discussed the possibility of a retinal tear or detachment. The signs/symptoms of a retinal tear/detachment were reviewed including, but not limited to, A sudden increase in size and number of floaters, indicating a retinal tear may be occurring, a sudden appearance of flashes (which could be the first stage of a retinal tear or detachment), having a shadow appear in the periphery of your field of vision, seeing a gray curtain moving across your field of vision, and/or a sudden decrease in your vision. The patient was instructed to contact us immediately if they noticed any of these signs or symptoms. S-(situation): Patient registered to Observation. Patient arrived to room 255 via cart from ED    B-(background): Closed fracture or right ankle after fall.     A-(assessment): A&Ox4.  Right ankle in splint/ace wrap. Pain 2/10 at rest to RLE, increases to 8/10 when moving leg.  Ice applied.  Right toes warm.  Denies numbness or tingling.  Denies nausea. Plan for surgery around 5pm. Lung clear. Due to void. Saline locked.     R-(recommendations): Orders and observation goals reviewed with patient and wife    Nursing Observation criteria listed below was met:    Skin issues/needs documented:Yes  Isolation needs addressed and Signage up: NA  Fall Prevention: Education given and documented: Yes  Education Assessment documented:Yes  Admission Education Documented: Yes  New medication patient education completed and documented (Possible Side Effects of Common Medications handout): Yes  OBS video/handout Reviewed & Documented: Yes  Allergies Reviewed: Yes  Medication Reconciliation Complete: Yes, completed by Med Rec  Home medications if not able to send immediately home with family stored here: NA  Reminder note placed in discharge instructions of home meds: NA  Patient has discharge needs (If yes, please explain): No  Patient discharge preferences addressed and charted on white board:  Yes  Provider notified that patient has arrived to the unit: Yes

## 2025-02-11 NOTE — TELEPHONE ENCOUNTER
Type of surgery: OPEN REDUCTION INTERNAL FIXATION, FRACTURE, ANKLE (Right)   Location of surgery: [facility]  Date and time of surgery: 2-11-25  Surgeon: Adolph  Pre-Op Appt Date: IN ER  Post-Op Appt Date:    Packet sent out:   Pre-cert/Authorization completed:    Date:

## 2025-02-12 ENCOUNTER — APPOINTMENT (OUTPATIENT)
Dept: PHYSICAL THERAPY | Facility: CLINIC | Age: 45
End: 2025-02-12
Attending: PHYSICIAN ASSISTANT
Payer: COMMERCIAL

## 2025-02-12 VITALS
TEMPERATURE: 97.9 F | RESPIRATION RATE: 18 BRPM | DIASTOLIC BLOOD PRESSURE: 47 MMHG | HEIGHT: 71 IN | BODY MASS INDEX: 28.15 KG/M2 | WEIGHT: 201.06 LBS | OXYGEN SATURATION: 98 % | HEART RATE: 61 BPM | SYSTOLIC BLOOD PRESSURE: 106 MMHG

## 2025-02-12 LAB
ANION GAP SERPL CALCULATED.3IONS-SCNC: 14 MMOL/L (ref 7–15)
BUN SERPL-MCNC: 14.7 MG/DL (ref 6–20)
CALCIUM SERPL-MCNC: 9.1 MG/DL (ref 8.8–10.4)
CHLORIDE SERPL-SCNC: 102 MMOL/L (ref 98–107)
CREAT SERPL-MCNC: 0.89 MG/DL (ref 0.67–1.17)
EGFRCR SERPLBLD CKD-EPI 2021: >90 ML/MIN/1.73M2
GLUCOSE SERPL-MCNC: 149 MG/DL (ref 70–99)
HCO3 SERPL-SCNC: 20 MMOL/L (ref 22–29)
HGB BLD-MCNC: 13.9 G/DL (ref 13.3–17.7)
POTASSIUM SERPL-SCNC: 4.7 MMOL/L (ref 3.4–5.3)
SODIUM SERPL-SCNC: 136 MMOL/L (ref 135–145)

## 2025-02-12 PROCEDURE — 97116 GAIT TRAINING THERAPY: CPT | Mod: GP | Performed by: PHYSICAL THERAPIST

## 2025-02-12 PROCEDURE — G0378 HOSPITAL OBSERVATION PER HR: HCPCS

## 2025-02-12 PROCEDURE — 250N000011 HC RX IP 250 OP 636: Performed by: PHYSICIAN ASSISTANT

## 2025-02-12 PROCEDURE — 97530 THERAPEUTIC ACTIVITIES: CPT | Mod: GP | Performed by: PHYSICAL THERAPIST

## 2025-02-12 PROCEDURE — 36415 COLL VENOUS BLD VENIPUNCTURE: CPT | Performed by: PHYSICIAN ASSISTANT

## 2025-02-12 PROCEDURE — 85018 HEMOGLOBIN: CPT | Performed by: PHYSICIAN ASSISTANT

## 2025-02-12 PROCEDURE — 97110 THERAPEUTIC EXERCISES: CPT | Mod: GP | Performed by: PHYSICAL THERAPIST

## 2025-02-12 PROCEDURE — 250N000013 HC RX MED GY IP 250 OP 250 PS 637: Performed by: PHYSICIAN ASSISTANT

## 2025-02-12 PROCEDURE — 97161 PT EVAL LOW COMPLEX 20 MIN: CPT | Mod: GP | Performed by: PHYSICAL THERAPIST

## 2025-02-12 PROCEDURE — 84132 ASSAY OF SERUM POTASSIUM: CPT | Performed by: PHYSICIAN ASSISTANT

## 2025-02-12 RX ORDER — HYDROXYZINE HYDROCHLORIDE 25 MG/1
25 TABLET, FILM COATED ORAL EVERY 6 HOURS PRN
Qty: 30 TABLET | Refills: 1 | Status: SHIPPED | OUTPATIENT
Start: 2025-02-12

## 2025-02-12 RX ADMIN — ACETAMINOPHEN 975 MG: 325 TABLET, FILM COATED ORAL at 07:53

## 2025-02-12 RX ADMIN — SENNOSIDES AND DOCUSATE SODIUM 1 TABLET: 50; 8.6 TABLET ORAL at 07:54

## 2025-02-12 RX ADMIN — POLYETHYLENE GLYCOL 3350 17 G: 17 POWDER, FOR SOLUTION ORAL at 07:55

## 2025-02-12 RX ADMIN — CEFAZOLIN SODIUM 2 G: 2 SOLUTION INTRAVENOUS at 07:54

## 2025-02-12 RX ADMIN — ASPIRIN 325 MG: 325 TABLET ORAL at 07:54

## 2025-02-12 ASSESSMENT — ACTIVITIES OF DAILY LIVING (ADL)
ADLS_ACUITY_SCORE: 15
ADLS_ACUITY_SCORE: 23
ADLS_ACUITY_SCORE: 15
ADLS_ACUITY_SCORE: 23
ADLS_ACUITY_SCORE: 15

## 2025-02-12 NOTE — PLAN OF CARE
Goal Outcome Evaluation:      Plan of Care Reviewed With: patient, spouse    Overall Patient Progress: improvingOverall Patient Progress: improving    Outcome Evaluation: Rating pain 1/10. Continuing on scheduled Tylenol. Slight tingling in right foot. Toe touch weight bearing. Dressing to right ankle is CDI and elevated on wedge. Ambulating with crutches and walker. Received IV abx. VSS Voiding. Anxious to get home.    S-(situation): Patient discharged to home via wheelchair to door with wife.    B-(background): Observation goals met     A-(assessment): as above    R-(recommendations): Discharge instructions reviewed with patient and wife. Listed belongings gathered and returned to patient.Will follow up with PATYS Montero 2-25-25  Patient Education resolved: Yes  New medications-Pt. Has been educated about reason of use and side effects Yes  Home medications returned to patient NA  Medication Bin checked and emptied on discharge Yes

## 2025-02-12 NOTE — OP NOTE
OPERATIVE NOTE:    PRE-OP DIAGNOSIS:  Right ankle trimalleolar fracture -dislocation     POST-OP DIAGNOSIS:  Right ankle trimalleolar fracture -dislocation    PROCEDURE:  open reduction and internal fixation right trimalleolar ankle fracture-dislocation    SURGEON: Adolph    ASSISTANT(S):  IVIS Saha    ANESTHESIA:  general    Indications:  David Segura is a 44 year old male with a displaced trimalleolar fracture dislocation of the right ankle, due to a fall earlier today.  The fracture is characterized by a Conrad B fracture of the fibula, widely displaced, a large, displaced (into the joint) medial malleolus fracture, and a small, but substantial posterior malleolus fragment .  His dislocation was posterior-lateral and was not holding well in the splint despite good reduction, and was very unstable.  It was felt that surgical intervention was most appropriate. Informed consent was obtained for the procedure.    Procedure:  He was taken to the operating room placed on the operating table in the supine position on the deflateable beanbag, where general anesthesia was induced. A tourniquet was placed around the proximal thigh, then he was propped up nearly full lateral, and the beanbag was deflated. Bony prominences were well padded.   The limb was prepped and draped in usual sterile fashion. Pause for site confirmation was performed. The leg was exsanguinated and the tourniquet inflated to 250mmhg.    A longitudinal incision was made in the interval between the achilles and the fibula to perform a posterolateral approach. The incision was carefully taken down through the skin and the subcutaneous tissue to the peroneals. These were pulled posteriorly and the fibula was exposed easily, in part because of the stripping from the injury.. The fracture was then reduced and held with clamps. Then, a 6 hole semi tubular plate was secured to the distal fibula posteriorly with a clamp and the reduction checked,  along with distal position of the plate so it would not lay in the peroneal groove. This antiglide plate was secured with 3.5 cortical screws in the proximal 3 holes, A 3.5 interfragmentary lag screw was placed anterior to posterior with excellent bite and fixation.   The distal holes were filled with 2 3.5 posterior-anterior cortical screws with great bite.    I then went posterior to the peroneals, and peeled the FHL muscle off the posterior tibia. I identified the small posterior malleolar fragment. I repaired this with a posterior to anterior cannulated 4.0 cancellous screw with a washer, which gave good compression of the fracture..    A slightly curved incision was made over the medial malleolus. Spreading technique was used to help identify the saphenous neurovascular structure which was retracted.  It had been damaged by the injury.  The fracture site was identified and periosteum and debris removed from the fracture. The medial talar dome appeared to be intact.   I then placed a distally threaded guidepin in the anterior third of the medial malleolar fragment and use this as a joystick to maneuver the fracture back into place. Once in place.  I passed the pin across the fracture site and checked the position of the pin by fluoroscopy.  A second pin was placed more posteriorly and parallel to the first pin. Position of this pin was checked by fluoroscopy. The depth of the pins were measured, and the distal cortex was drilled with the cannulated drill. Then partially threaded 4.0 cannulated screws were placed over the guidewires. These had very good bite with compression of the fracture.  Final fluoroscopic pictures were taken.    The wound(s) were irrigated.  The tourniquet was deflated and some minor venous bleeders medially wear controlled with the Bovie..   The wound(s)  were closed with a few interrupted 2-0 Vicryl sutures in the subcutaneous layer and interrupted 3-0 nylon sutures placed in a vertical  mattress fashion and the skin using Allgower knots.  Sterile dressings were applied, and a well padded Charly Carney type splint was applied.  The patient was awakened and transferred to the hospital cart and to the recovery area in stable condition.    PLAN: nonweightbearing x 4 weeks.  Start range of motion in 2 weeks.    EBL: 50cc    Complications: none      TORSTEN Farfan MD  Dept. Orthopedic Surgery  [facility]

## 2025-02-12 NOTE — ANESTHESIA POSTPROCEDURE EVALUATION
Patient: David Segura    Procedure: Procedure(s):  OPEN REDUCTION INTERNAL FIXATION, TRIMALLEOLAR  ANKLE FRACTURE       Anesthesia Type:  General    Note:  Disposition: Inpatient   Postop Pain Control: Uneventful            Sign Out: Well controlled pain   PONV: No   Neuro/Psych: Uneventful            Sign Out: Acceptable/Baseline neuro status   Airway/Respiratory: Uneventful            Sign Out: Acceptable/Baseline resp. status   CV/Hemodynamics: Uneventful            Sign Out: Acceptable CV status   Other NRE: NONE   DID A NON-ROUTINE EVENT OCCUR? No    Event details/Postop Comments:  Pt was happy with anesthesia care.  No complications.  I will follow up with the pt if needed.           Last vitals:  Vitals Value Taken Time   /63 02/11/25 2100   Temp 98.1  F (36.7  C) 02/11/25 2055   Pulse 85 02/11/25 2101   Resp 16 02/11/25 2100   SpO2 95 % 02/11/25 2101   Vitals shown include unfiled device data.    Electronically Signed By: ANITA Cruz CRNA  February 12, 2025  9:59 AM

## 2025-02-12 NOTE — PROGRESS NOTES
Transfer from PCU,  to Room 255  Transferred on bed      S: 45 y/o male S/P ORIF right ankle  Anesthesia Type: general with a block  Surgeon: Dr. Farfan  Allergies: See Medication Reconciliation Record    B: Pertinent Medical History:   Past Medical History:   Diagnosis Date    POPEYE (obstructive sleep apnea)        Surgical History:   Past Surgical History:   Procedure Laterality Date    HC FLEX SIGMOIDOSCOPY W/WO ELAN SPEC BY BRUSH/WASH  2005    normal per patient    HERNIA REPAIR, INGUINAL RT/LT  2001    left   FULL CODE    A: EBL: 50 ml  IVF: 1500 ml  UOP: none  NPO: ___Yes _x__No   Vomiting: ___Yes _x__No   Drainage: Drsg CDI with ice pack and elevated RLE  Skin Integrity: Drsg CDI, SHANNON incisions (Normal; Pressure Ulcer (Location)  Pain: denies  On  DVPRS Scale  See PACU record for ongoing assessment, vital signs and pain assessment.    RFO (Retained Foreign Object) _x__Yes___No (identify item if present) Hardware in RLE  Brace/sling/equipment: _x__Yes___No (identify item if present)Splint, ACE wrap, foam wedge for elevating    Report Given to:  Ismael ANTHONY    R: Post-Op vitals and assessments as ordered/indicated per patient's condition.  Follow Post-Op orders and notify Physician prn.  Continue to involve patient/family in plan of care and discharge planning.  Reinforce Pre-Operative education.  Implement skin safety interventions as appropriate.

## 2025-02-12 NOTE — ANESTHESIA PROCEDURE NOTES
Adductor canal Procedure Note    Pre-Procedure   Staff -        CRNA: Lucas Rocha APRN CRNA       Performed By: CRNA       Location: post-op       Procedure Start/Stop Times: 2/11/2025 7:58 AM and 2/11/2025 8:04 AM       Pre-Anesthestic Checklist: patient identified, IV checked, site marked, risks and benefits discussed, informed consent, monitors and equipment checked, pre-op evaluation, at physician/surgeon's request and post-op pain management  Timeout:       Correct Patient: Yes        Correct Procedure: Yes        Correct Site: Yes        Correct Position: Yes        Correct Laterality: Yes        Site Marked: Yes  Procedure Documentation  Procedure: Adductor canal         Laterality: right       Patient Position: supine       Patient Prep/Sterile Barriers: sterile gloves, mask       Skin prep: Chloraprep       Local skin infiltrated with 3 mL of 2% lidocaine.        Needle Type: insulated       Needle Gauge: 22.        Needle Length (millimeters): 100        Ultrasound guided       1. Ultrasound was used to identify targeted nerve, plexus, vascular marker, or fascial plane and place a needle adjacent to it in real-time.       2. Ultrasound was used to visualize the spread of anesthetic in close proximity to the above referenced structure.       3. A permanent image is entered into the patient's record.       4. The visualized anatomic structures appeared normal.       5. There were no apparent abnormal pathologic findings.    Assessment/Narrative         The placement was negative for: blood aspirated, painful injection and site bleeding       Paresthesias: No.       Test dose of mL at.         Test dose negative, 3 minutes after injection, for signs of intravascular, subdural, or intrathecal injection.       Bolus given via needle..        Secured via.        Insertion/Infusion Method: Single Shot       Complications: none       Injection made incrementally with aspirations every 5  "mL.    Medication(s) Administered   Medication Administration Time: 2/11/2025 7:58 AM     Comments:  Placed with the assistance of an RN.  Good visualization of the femoral artery and the local spread lateral to the artery.  No HR increase with injections and no other complications noted.  I will follow up with the pt if needed.      FOR [facility] ([facility]) ONLY:   Pain Team Contact information: please page the Pain Team Via [employer]. Search \"Pain\". During daytime hours, please page the attending first. At night please page the resident first.      "

## 2025-02-12 NOTE — ANESTHESIA PROCEDURE NOTES
Popliteal Procedure Note    Pre-Procedure   Staff -        CRNA: Lucas Rocha APRN CRNA       Performed By: CRNA       Location: OR       Procedure Start/Stop Times: 2/11/2025 5:40 AM and 2/11/2025 5:47 AM       Pre-Anesthestic Checklist: patient identified, IV checked, site marked, risks and benefits discussed, informed consent, monitors and equipment checked, pre-op evaluation, at physician/surgeon's request and post-op pain management  Timeout:       Correct Patient: Yes        Correct Procedure: Yes        Correct Site: Yes        Correct Position: Yes        Correct Laterality: Yes        Site Marked: Yes  Procedure Documentation  Procedure: Popliteal         Laterality: right       Patient Position: supine       Patient Prep/Sterile Barriers: sterile gloves, mask       Skin prep: Chloraprep       Needle Type: insulated       Needle Gauge: 22.        Needle Length (millimeters): 100        Ultrasound guided       1. Ultrasound was used to identify targeted nerve, plexus, vascular marker, or fascial plane and place a needle adjacent to it in real-time.       2. Ultrasound was used to visualize the spread of anesthetic in close proximity to the above referenced structure.       3. A permanent image is entered into the patient's record.       4. The visualized anatomic structures appeared normal.       5. There were no apparent abnormal pathologic findings.       Nerve Stim: Initial Level 0.8 mA.  Lowest motor response 0.4 mA.    Assessment/Narrative         The placement was negative for: blood aspirated, painful injection and site bleeding       Paresthesias: No.       Test dose of mL at.         Test dose negative, 3 minutes after injection, for signs of intravascular, subdural, or intrathecal injection.       Bolus given via needle..        Secured via.        Insertion/Infusion Method: Single Shot       Complications: none       Injection made incrementally with aspirations every 5 mL.    Medication(s)  "Administered   Medication Administration Time: 2/11/2025 5:40 AM     Comments:  Pt tolerated the procedure well.  No complications were noted.  Good visualization of the merge point of the common peroneal and Tibial nerves was noted.  Local was visualized surrounding this junction.  I will follow up with the pt if needed.      FOR [facility] ([facility]) ONLY:   Pain Team Contact information: please page the Pain Team Via [employer]. Search \"Pain\". During daytime hours, please page the attending first. At night please page the resident first.      "

## 2025-02-12 NOTE — DISCHARGE INSTRUCTIONS
Open Reduction Internal Fixation of Ankle discharge Instructions                                     [phone]   Bone and Joint Service Line for issues or concerns    Home Prescriptions:  Tylenol and Ibuprofen as needed.  Oxycodone and Vistaril also ordered.      General Care:  After surgery you may feel tired/sleepy. This is normal. Please have someone stay with you for 24 hours after surgery. You should avoid driving until released by your physician to do so. If you have any question along the way please contact the office. If you feel it is an issue cannot wait for normal office hours, contact the on-call physician.    Bandages:   Keep this Bandage/Splint clean and dry. You can always loosen the ace wrap if it feels too tight or you continue to have numbness or tingling.  Keep your bandage and splint on until follow-up appointment.      Bathing:  Do not submerge, scrub or soak your incision in water such as a bath or pool. Keep your splint/bandage clean and dry. Keep your incision/splint covered with a sealed bandage when bathing. Saran wrap and a bag works well.     Follow up:  Your follow up appointment should already be scheduled. If its not, please call the office to verify an appointment about 10-14 days after surgery.     Diet:  Start with non-alcoholic liquids at first, particularly water or sports drinks after surgery. Progress to bland foods such as crackers and bread and finally to your normal diet if you have no problems.     Pain control:  Take your pain medications as prescribed (if prescribed). These medications may make you sleepy. Do not drive, operate equipment, or drink alcohol when taking these.  You may take Tylenol (Generic name is acetaminophen) as directed on the bottle in place of the prescribed pain medications as your pain gets better. You may take NSAIDs (Motrin, Ibuprofen, Aleve, Naproxen) as directed on the bottle for minor discomfort. If the medications cause a reaction such as  nausea or skin rash, stop taking them and contact your doctor. Please plan accordingly, pain medications will not be re-filled on the weekends or at night. Call the office during the day if you need more medications.    Physical Therapy/Occupational Therapy:  At your first post-operative visit, your doctor will direct you on your personal therapy program if needed.     Activity:  Keep your leg elevated above your heart level for the first couple days after surgery. Avoid much activity and not elevating with the operated leg.  We recommend elevating 23 hours/day for the first couple of days.  Ice 15 minutes on and 15 minutes off.      Normal findings after surgery:  Numbness and tenderness around the incisions is normal.  You may have bruising around the incisions.  Low grade fevers less than 100.5 degrees Fahrenheit are normal.     When to call the Office:  Temperature greater than 101.5 degrees Fahreheit.  Fever, chills, and increasing pain in the hand and wrist.  Excessive drainage from the incisions that include bright red blood.  Drainage from the incisions sites that appear yellow, pus-like, or foul smelling.  Persistent nausea or vomiting.  Any other effects you feel are significant.

## 2025-02-12 NOTE — ANESTHESIA CARE TRANSFER NOTE
Patient: David Segura    Procedure: Procedure(s):  OPEN REDUCTION INTERNAL FIXATION, TRIMALLEOLAR  ANKLE FRACTURE       Diagnosis: Closed fracture dislocation of right ankle, initial encounter [S82.814L]  Diagnosis Additional Information: No value filed.    Anesthesia Type:   General     Note:    Oropharynx: oropharynx clear of all foreign objects and spontaneously breathing  Level of Consciousness: drowsy  Oxygen Supplementation: face mask  Level of Supplemental Oxygen (L/min / FiO2): 6  Independent Airway: airway patency satisfactory and stable  Dentition: dentition unchanged  Vital Signs Stable: post-procedure vital signs reviewed and stable  Report to RN Given: handoff report given  Patient transferred to: PACU    Handoff Report: Identifed the Patient, Identified the Reponsible Provider, Reviewed the pertinent medical history, Discussed the surgical course, Reviewed Intra-OP anesthesia mangement and issues during anesthesia, Set expectations for post-procedure period and Allowed opportunity for questions and acknowledgement of understanding  Vitals:  Vitals Value Taken Time   /70 02/11/25 2020   Temp 98.3  F (36.8  C) 02/11/25 2020   Pulse 92 02/11/25 2023   Resp 16 02/11/25 2020   SpO2 99 % 02/11/25 2023   Vitals shown include unfiled device data.    Electronically Signed By: ANITA Salcido CRNA  February 11, 2025  8:24 PM

## 2025-02-12 NOTE — PROGRESS NOTES
"[facility]    Progress note    44 year old male POD#1 s/p right ankle trimalleolar ankle fracture open reduction internal fixation by Dr. Farfan  S: Patient seen at bedside in no apparent distress, alert and pleasant.  I discussed surgery and rehabilitation with the patient.  He denies numbness, tingling or major pain issues. X-rays printed off and given and explained to the patient today.  Discussed discharge and follow-up and therapy and treatment plan and elevation and icing.  Patient is excited to leave the hospital and get going with his routine at home.  O: CMS intact right LE, moving all toes, toes warm to the touch       Splint on, clean and dry.  No splint abrasions       Right leg and toes with minimal swelling and no erythema or ecchymosis        Contralateral calf soft and non tender    Vital signs:  Temp: 98.1  F (36.7  C) Temp src: Oral BP: 114/77 Pulse: 58   Resp: 16 SpO2: 98 % O2 Device: None (Room air) Oxygen Delivery: 8 LPM Height: 180.3 cm (5' 11\") Weight: 91.2 kg (201 lb 1 oz)  Estimated body mass index is 28.04 kg/m  as calculated from the following:    Height as of this encounter: 1.803 m (5' 11\").    Weight as of this encounter: 91.2 kg (201 lb 1 oz).      Hemoglobin   Date Value Ref Range Status   02/12/2025 13.9 13.3 - 17.7 g/dL Final     No results found for: \"INR\"      A/P:  1. Pain-controlled with Tylenol, Dilaudid IV ordered and oxycodone 5 to 10 mg ordered but both not used yet.  2. DVT Prophylaxis-Asprin 325mg daily x 4 weeks   3. Weight Bearing Status-touchdown weightbearing right LE x 4 weeks  4. Physical Therapy-to teach crutch/walker/rolling walker training.  Thank you  5. Hospitalist-Dr. Rossy MD saw patient yesterday.  Hospitalist primary.  Secure message sent to Dr. Miramontes  6. Incision-no signs or symptoms of infection, covered by splint.  7. Plan-anticipate discharge today to home with wife who can care for him.  Only 2 stairs and 1 level house. "  Orthopedic discharge orders in and medications reconciled.  Discussed plan with charge RNs and secure message sent to hospitalist.  Update message sent to Dr. Farfan.    George Funes PA-C  2/12/2025

## 2025-02-12 NOTE — PROGRESS NOTES
02/12/25 0717   Appointment Info   Signing Clinician's Name / Credentials (PT) Brenda Hogan PT, DPT, ATC, LAT and Stefani Mejia SPT   Student Supervision Therapy services provided with the co-signing licensed therapist guiding and directing the services, and providing the skilled judgement and assessment throughout the session;Direct Patient Contact Provided;Line of sight supervision provided   Rehab Comments (PT) PT eval and treat Status post foot / ankle surgery Activity order: touchdown weightbearing right LE x 4 weeks       Present no   Living Environment   People in Home child(shelly), dependent;child(shelly), adult;spouse   Current Living Arrangements house   Home Accessibility stairs to enter home   Number of Stairs, Main Entrance 4   Stair Railings, Main Entrance railing on left side (ascending)   Transportation Anticipated family or friend will provide   Living Environment Comments Patient lives in a one level home with 4 stairs to enter home. wife able to support at home. patient IND at baseline and highly active, eager to get back to activity.   Self-Care   Usual Activity Tolerance excellent   Current Activity Tolerance moderate   Equipment Currently Used at Home none   Fall history within last six months yes   Number of times patient has fallen within last six months 1   Activity/Exercise/Self-Care Comment Patient at baseline does not use any equipment devices. per wife family resources available for walker, wheelchair, shower chair and knee scooter as needed.   General Information   Onset of Illness/Injury or Date of Surgery 02/11/25   Referring Physician Dr. Farfan   Patient/Family Therapy Goals Statement (PT) To go home.   Pertinent History of Current Problem (include personal factors and/or comorbidities that impact the POC) Patient is a 44 year old male, day 1 status post right ankle trimalleolar fracture repair with ORIF by Dr. Farfan, general anesthesia with local block, 50mL  EBL. Patient with a previous medical history of POPEYE, prediabetic, HLD, chronic left shoulder pain.   Existing Precautions/Restrictions weight bearing;brace worn when out of bed   Weight-Bearing Status - LUE full weight-bearing   Weight-Bearing Status - RUE toe touch weight-bearing   Weight-Bearing Status - LLE full weight-bearing   Weight-Bearing Status - RLE full weight-bearing   General Observations Wife present in the room with pt. Pt is currently wearing splint for R LE and has a weight bearing tolerance of toe touch.   Cognition   Affect/Mental Status (Cognition) WFL   Orientation Status (Cognition) oriented x 4;oriented to;person;place;situation;time   Follows Commands (Cognition) follows multi-step commands;over 90% accuracy   Pain Assessment   Patient Currently in Pain Yes, see Vital Sign flowsheet  (block intact at foot, notes right knee pain with mobility)   Integumentary/Edema   Integumentary/Edema Comments post op dressing intact, WNL throughout otherwise   Posture    Posture Protracted shoulders   Range of Motion (ROM)   Range of Motion ROM deficits secondary to surgical procedure   ROM Comment Did not assess Right ankle mobility due to post op splint; Knee and hip ROM WFL   Strength (Manual Muscle Testing)   Strength (Manual Muscle Testing) Able to perform R SLR;Able to perform L SLR   Strength Comments Bilateral hip flexion 4+5, bilateral knee flexion 4+/5, bilateral knee extension 4+/5   Bed Mobility   Bed Mobility sit-supine   Sit-Supine Perkins (Bed Mobility) independent   Transfers   Transfers sit-stand transfer   Maintains Weight-bearing Status (Transfers) able to maintain   Impairments Contributing to Impaired Transfers decreased ROM   Sit-Stand Transfer   Sit-Stand Perkins (Transfers) modified independence   Assistive Device (Sit-Stand Transfers) walker, standard   Gait/Stairs (Locomotion)   Perkins Level (Gait) independent   Assistive Device (Gait) walker, front-wheeled    Distance in Feet (Gait) 10   Pattern (Gait) 2-point   Deviations/Abnormal Patterns (Gait) stride length decreased;gait speed decreased   Maintains Weight-bearing Status (Gait) able to maintain   Balance   Balance no deficits were identified   Sensory Examination   Sensory Perception Comments impaired RLE due to operative procedure   Coordination   Coordination no deficits were identified   Muscle Tone   Muscle Tone no deficits were identified   Clinical Impression   Criteria for Skilled Therapeutic Intervention Yes, treatment indicated   PT Diagnosis (PT) impaired mobility   Influenced by the following impairments post op ankle fracture repair, TDWB   Functional limitations due to impairments use of AD for functional mobility, fatigue   Clinical Presentation (PT Evaluation Complexity) evolving   Clinical Presentation Rationale clinical judgment, medical status, baseline function, post op acuity   Clinical Decision Making (Complexity) low complexity   Planned Therapy Interventions (PT) bed mobility training;gait training;home exercise program;ROM (range of motion);stair training;strengthening;transfer training;risk factor education;progressive activity/exercise;home program guidelines   Risk & Benefits of therapy have been explained evaluation/treatment results reviewed;care plan/treatment goals reviewed;current/potential barriers reviewed;risks/benefits reviewed;participants included;patient;spouse/significant other   Clinical Impression Comments Patient present with functional mobility below his near baseline in the setting of post op ankle fracture with limited WB. Following today's functional training he demonstrates the ability to navigate an environment similar to his home with DME available and family support. Pt is able to follow multi-step commands. Pt demonstrated decrease in ROM in R LE secondary to POD#1 s/p right ankle trimalleolar ankle fracture. Patient would benefit from return home with wife and  progression to OP PT once surgeon clears him to progress  in order to regain functional mobility, strength, and safety at home.   PT Total Evaluation Time   PT Eval, Low Complexity Minutes (97419) 10   Physical Therapy Goals   PT Frequency One time eval and treatment only   PT Predicted Duration/Target Date for Goal Attainment 02/12/25   PT Goals Transfers;Gait;Stairs   PT: Transfers Modified independent;Assistive device;Sit to/from stand   PT: Gait Modified independent;Assistive device;Within precautions;50 feet   PT: Stairs Supervision/stand-by assist;2 stairs;Rail on right;Within precautions;Assistive device   PT Discharge Planning   PT Plan no IP PT needs  (Simultaneous filing. User may not have seen previous data.)   PT Discharge Recommendation (DC Rec) home with outpatient physical therapy;home with assist  (when surgeon clears him to progress)   PT Rationale for DC Rec Patient from home with wife and children, stairs to enter and main level living with. Patient present with functional mobility below his near baseline in the setting of post op ankle fracture with limited WB. Following today's functional training he demonstrates the ability to navigate an environment similar to his home with DME available and family support. Pt is able to follow multi-step commands. Pt demonstrated decrease in ROM in R LE secondary to POD#1 s/p right ankle trimalleolar ankle fracture. Patient would benefit from return home with wife and progression to OP PT once surgeon clears him to progress in order to regain functional mobility, strength, and safety at home.   PT Brief overview of current status Mod IND STS transfer with crutches and walker, Mod IND stairs x 4 with rail and or crutches, Mod IND gait with crutches, and IND bed mobility   PT Total Distance Amb During Session (feet) 70   PT Equipment Needed at Discharge crutches, axillary   Physical Therapy Time and Intention   Total Session Time (sum of timed and untimed  services) 10     Thank you for your referral.  Brenda Hogan, PT, DPT, ATC, [facility]  O: [phone]  E: [email or web address]

## 2025-02-17 LAB
ATRIAL RATE - MUSE: 62 BPM
DIASTOLIC BLOOD PRESSURE - MUSE: NORMAL MMHG
INTERPRETATION ECG - MUSE: NORMAL
P AXIS - MUSE: 50 DEGREES
PR INTERVAL - MUSE: 148 MS
QRS DURATION - MUSE: 90 MS
QT - MUSE: 418 MS
QTC - MUSE: 424 MS
R AXIS - MUSE: 105 DEGREES
SYSTOLIC BLOOD PRESSURE - MUSE: NORMAL MMHG
T AXIS - MUSE: 43 DEGREES
VENTRICULAR RATE- MUSE: 62 BPM

## 2025-02-25 ENCOUNTER — ANCILLARY PROCEDURE (OUTPATIENT)
Dept: GENERAL RADIOLOGY | Facility: CLINIC | Age: 45
End: 2025-02-25
Attending: PHYSICIAN ASSISTANT
Payer: COMMERCIAL

## 2025-02-25 ENCOUNTER — OFFICE VISIT (OUTPATIENT)
Dept: ORTHOPEDICS | Facility: CLINIC | Age: 45
End: 2025-02-25
Payer: COMMERCIAL

## 2025-02-25 VITALS — BODY MASS INDEX: 28.14 KG/M2 | HEIGHT: 71 IN | TEMPERATURE: 97.3 F | WEIGHT: 201 LBS

## 2025-02-25 DIAGNOSIS — Z98.890 HISTORY OF ANKLE SURGERY: Primary | ICD-10-CM

## 2025-02-25 DIAGNOSIS — S82.891A CLOSED FRACTURE OF RIGHT ANKLE, INITIAL ENCOUNTER: ICD-10-CM

## 2025-02-25 PROCEDURE — 73610 X-RAY EXAM OF ANKLE: CPT | Mod: TC | Performed by: INTERNAL MEDICINE

## 2025-02-25 ASSESSMENT — PAIN SCALES - GENERAL: PAINLEVEL_OUTOF10: NO PAIN (0)

## 2025-02-25 NOTE — PATIENT INSTRUCTIONS
Encounter Diagnosis   Name Primary?    Right ankle open reduction internal fixation with Dr. Farfan 2/11/2025 Yes     Rest, ice and elevate above heart level as needed for pain control  1.  X-ray: X-rays look excellent  2.  Exam: Knee range of motion and strength are good today.  This is all getting get better.  3.  Therapy: You have formal physical therapy starting on 3/6/2025 which is excellent.  They will start working on range of motion and gentle strengthening.  4.  Home therapy: Right now I want you to work on the range of motion of your toes and ankle 3-5 times a day.  5.  Boot: Wear your boot when you are up and moving.  It is okay to have it off when you are just relaxing or elevating.  6.  Weightbearing status: Continue touchdown weightbearing until I see you back.  At that time we will probably advance you to weightbearing as tolerated.  7.  Plan: I will see you back in 2 weeks and then after that again in 8 weeks.  8.  Follow-up: Follow-up in 2 weeks with x-ray with myself.  [employer] may offer reliable information regarding your diagnosis and treatment plan.    THANK YOU for coming in today. If you receive a survey via [employer] or mail please let us know if there was anything you especially appreciated today or if there is any way we can improve our clinic. We appreciate your input.    GENERAL INFORMATION:  Bone and Joint Service Line for any issues or concerns: [phone]      I am not in the office Thursdays. Therefore non- urgent calls and medical messages received on Thursday will be addressed when we are back in the office on Wednesday. Urgent matters will be reviewed and addressed by one of our partners in the office as needed.    If lab work was done today as part of your evaluation you will generally be contacted via [employer] mail, or phone with the results within 1-5 days. If there is an alarming result we will contact you by phone. Lab results come back at varying times, I  generally wait until all labs are resulted before making comments on results. Please note labs are automatically released to [facility] (if you have signed up for it) once available-at times you may see these prior to my having a chance to review them as well.    If you need refills please contact your pharmacist. They will send a refill request to me to review. Please allow 3 business days for us to process all refill requests. All narcotic refills should be handled in the clinic at the time of your visit.

## 2025-02-25 NOTE — LETTER
2/25/2025      David Segura  [address]  [place] [address]      Dear Colleague,    Thank you for referring your patient, David Segura, to the [facility]. Please see a copy of my visit note below.    Orthopedic Clinic Post-Operative Note    CHIEF COMPLAINT:   Chief Complaint   Patient presents with     Surgical Followup      open reduction and internal fixation right trimalleolar ankle fracture-dislocation DOS 2-11-25       HISTORY OF PRESENT ILLNESS  Location: Right ankle  Rating of Pain: 0 out of 10 currently but can be mild  Pain is better with: Rest  Pain improving overall: Yes  Associated Features: Denies numbness or tingling shooting burning electric pain.  Denies any problems with the incision or any fevers chills nausea or vomiting.  Patient concerns: Wondering the plan  Additional History: Patient had a fall on the ice and had a fracture dislocation of the right ankle with was trimalleolar ankle fracture.  Emergency department was not able to keep it reduced.  Patient was admitted and Dr. Farfan was consulted to do an open reduction internal fixation so than the ankle fracture could stay reduced.  This surgery took place on 2/11/2025.  Patient stayed in the hospital overnight and was discharged to home after that and was put on aspirin 325 mg once a day x 4 weeks and also was touchdown weightbearing x 4 weeks.  The plan was to start formal physical therapy at about 4 weeks to work on range of motion and gentle strengthening.  Patient was to follow-up in 2 weeks with myself to get the splint off in a cam boot on and range of motion started.  Patient was discharged on oxycodone 5 to 10 mg.  Patient has been off of Tylenol as well as oxycodone since Friday.  Patient has noticed a little bit of throbbing every once well but otherwise he is not having much for pain.  Patient denies any numbness or tingling.  Patient has been doing his touchdown weightbearing only  "so far.  Patient is taking his aspirin.    Patient's past medical, surgical, social and family histories reviewed.     REVIEW OF SYSTEMS  Review of systems negative other than positive findings in HPI.    Physical Exam:  Vitals: Temp 97.3  F (36.3  C) (Temporal)   Ht 1.803 m (5' 11\")   Wt 91.2 kg (201 lb)   BMI 28.03 kg/m    BMI= Body mass index is 28.03 kg/m .  Constitutional: healthy, alert and no acute distress   Psychiatric: mentation appears normal and affect normal/bright  NEURO: no focal deficits, CMS intact right lower extremity  RESP: Normal with easy respirations and no use of accessory muscles to breathe, no audible wheezing or retractions  CV: Calf soft and nontender to palpation, leg warm, +2 dorsalis pedis pulse  SKIN: Medial and lateral incisions healing well with no erythema swelling or drainage and sutures intact.  Sutures are removed today.  The rest of the ankle with swelling but no erythema, rashes, excoriation, or breakdown. No evidence of infection.   MUSCULOSKELETAL:  INSPECTION of right ankle: Incisions as mentioned above.  We do notice swelling about the right ankle but no gross deformities, erythema, edema, ecchymosis, atrophy or fasciculations.   PALPATION: No tenderness medial or lateral malleolus or the calcaneus foot toes or Achilles tendon.   ROM: Passive: plantar flex to 15 and dorsal flex to 0, moving all toes.  The range of motion is without catching locking or pain.    STRENGTH: 4 out of 5 plantar flexion and dorsiflexion as well as flexor hallucis longus and extensor hallucis longus without pain.   SPECIAL TEST: none  GAIT: Not observed today.  Lymph: no palpable lymph nodes    Diagnostic Modalities:  Recent Results (from the past 744 hours)   XR Ankle Right 2 Views    Narrative    EXAM: XR ANKLE RIGHT 2 VIEWS  LOCATION: [facility]  DATE: 2/11/2025    INDICATION: injury,  COMPARISON: None.      Impression    IMPRESSION:     The talus is " dislocated laterally and posteriorly relative to the distal tibia. There is an acute appearing, comminuted and displaced fracture of the distal fibula with apex volar and valgus angulation. There are also acute, displaced fractures of the   medial and posterior malleoli.    XR Ankle Right 1 View    Narrative    EXAM: XR ANKLE RIGHT 1 VIEW  LOCATION: [facility]  DATE: 2/11/2025    INDICATION: Ankle pain. Fracture.  COMPARISON: 2/11/2025 radiographs obtained at 0743 hours.      Impression    IMPRESSION: Acute fracture-dislocation of the right ankle with moderately displaced fractures of the medial and lateral malleoli and lateral dislocation of the talus. No significant interval change since the prior study.   Ankle XR, G/E 3 views, right    Narrative    EXAM: XR ANKLE RIGHT G/E 3 VIEWS  LOCATION: [facility]  DATE: 2/11/2025    INDICATION: SP reduction.  COMPARISON: Same-day radiograph.      Impression    IMPRESSION: Portable single-view assisted/stress radiograph shows improved alignment of the tibiotalar dislocation. The medial malleolus and distal fibula/lateral malleolus fracture alignment also has improved. Soft tissue swelling.   Ankle XR, G/E 3 views, right    Narrative    EXAM: XR ANKLE RIGHT G/E 3 VIEWS  LOCATION: [facility]  DATE: 2/11/2025    INDICATION: sp reduction  COMPARISON: 12/11/2025      Impression    IMPRESSION: Interval reduction of the fracture dislocation of the right ankle with improved alignment from initial prereduction radiographs, though there is increased lateral translation of the talus relative to the tibial plafond when compared to   pre-splinting post reduction radiographs earlier today. Displaced medial malleolar fracture fragment within the medial clear space. Mildly impacted and moderately displaced oblique fracture of the distal fibula with extension to the distal tibiofibular    syndesmosis. Splinting material about the right ankle. Soft tissue swelling.   CT Ankle Right w/o Contrast    Narrative    EXAM: CT ANKLE RIGHT W/O CONTRAST  LOCATION: [facility]  DATE: 2/11/2025    INDICATION: fracture dislocation  COMPARISON: Radiograph 2/11/2025  TECHNIQUE: Noncontrast. Axial, sagittal and coronal thin-section reconstruction. Dose reduction techniques were used.     FINDINGS:     BONES:  -Reduced right ankle fracture dislocation. Mildly displaced posterior malleolus fracture with fracture fragment accounting for approximately 7 mm of the articular surface. There is 3 mm of posterior displacement and 2 mm of proximal displacement. There   are small adjacent fracture fragments. Displaced obliquely oriented fracture of the anteromedial tibia involving the medial metaphysis anteriorly and extending into the base of the medial malleolus. There is a 1 cm lateral displacement of the distal   fracture fragment which is distally displaced and rotated. There are small adjacent comminuted fracture fragments. Comminuted fracture of the distal fibula at the level of the tibial plafond with posterior and lateral displacement of the dominant distal   fracture fragment and apex anterior angulation. There is an additional posteriorly displaced butterfly fragment superiorly. Additional small comminuted fracture fragments are present.  -There is lateral and mild posterior subluxation of the talus at the tibiotalar joint.  -Small tibiotalar joint hemarthrosis which extravasates into the surrounding soft tissues.  -Joint spaces are otherwise preserved.  -Small type I accessory navicular bone.  -Benign bone island in the medial cuneiform.    SOFT TISSUES:  -Nonorganized blood products and stranding about the fractures.  -The medial malleolus fracture fragment comes in close proximity to the anterior tibialis tendon which otherwise appears normal.  -Tendons are grossly normal. No gross  tendinous entrapment.  -Muscles are grossly intrinsically normal.      Impression    IMPRESSION:  1.  Reduced right ankle fracture dislocation with mildly displaced posterior malleolus fracture, displaced obliquely oriented fracture of the anteromedial tibia involving the medial metaphysis and base of the medial malleolus, and comminuted and   displaced fracture of the distal fibula at the level of the tibial plafond.  2.  Persistent lateral and mild posterior subluxation of the talus at the tibiotalar joint.         XR Surgery ALEX L/T 5 Min Fluoro w Stills    Narrative    This exam was marked as non-reportable because it will not be read by a   radiologist or a [facility] non-radiologist provider.           I agree with above readings.    X-rays done today showing good hardware placement no hardware failure and no change in positioning the fracture sites.  Good and equal congruent mortise is noted.  No other fracture dislocation or tumor no mineralization seen over the fracture sites yet.      Independent visualization of the images was performed.      Impression: 1.  1.  15 days status post Open reduction internal fixation of right trimalleolar ankle fracture dislocation by Dr. Adolph Mckenzie    FOCUSED PLAN:   Patient is doing very well without any major complaints.  Patient is using his rolling walker.  Patient has maintained his touchdown weightbearing status.  Splint removed today patient placed into a cam boot.  Patient educated on doing range of motion 3-5 times a day.  Patient to have the cam boot on when up and ambulating.  Patient has been off oxycodone and Tylenol since Friday and not having much pain.  Patient to continue elevating above heart level is much as possible and icing.  Formal physical therapy order was put in previously and is set to begin on 3/6/2025.  They will work on range of motion and gentle strengthening.  Patient will follow-up in 2 weeks and at that time if everything looks good he can  weight-bear as tolerated.  AVS with all the information created for the patient and his wife.  Follow-up in 2 weeks with x-ray.    Re-x-ray on return: Yes AP lateral and mortise view of right ankle      This note was dictated with [employer].    George Funes PA-C              Again, thank you for allowing me to participate in the care of your patient.        Sincerely,        George Funes PA-C    Electronically signed

## 2025-03-06 ENCOUNTER — THERAPY VISIT (OUTPATIENT)
Dept: PHYSICAL THERAPY | Facility: CLINIC | Age: 45
End: 2025-03-06
Attending: PHYSICIAN ASSISTANT
Payer: COMMERCIAL

## 2025-03-06 DIAGNOSIS — S82.891A CLOSED FRACTURE DISLOCATION OF RIGHT ANKLE, INITIAL ENCOUNTER: ICD-10-CM

## 2025-03-06 DIAGNOSIS — Z98.890 HISTORY OF ANKLE SURGERY: ICD-10-CM

## 2025-03-06 PROCEDURE — 97161 PT EVAL LOW COMPLEX 20 MIN: CPT | Mod: GP | Performed by: PHYSICAL THERAPIST

## 2025-03-06 PROCEDURE — 97110 THERAPEUTIC EXERCISES: CPT | Mod: GP | Performed by: PHYSICAL THERAPIST

## 2025-03-06 ASSESSMENT — ACTIVITIES OF DAILY LIVING (ADL)
PLEASE_INDICATE_YOR_PRIMARY_REASON_FOR_REFERRAL_TO_THERAPY:: FOOT AND/OR ANKLE
LEFS_RAW_SCORE: INCOMPLETE
ANY_OF_YOUR_USUAL_WORK,_HOUSEWORK_OR_SCHOOL_ACTIVITIES: NO DIFFICULTY
LEFS_SCORE(%): INCOMPLETE

## 2025-03-06 NOTE — PROGRESS NOTES
PHYSICAL THERAPY EVALUATION  Type of Visit: Evaluation              Subjective         Presenting condition or subjective complaint: broken ankle Pt is a 43 yo male who presents to PT with R ankle fx sustained slipping on the ice, ORIF to repair. Pt currently TTWB in cam boot and using knee scooter for mobility.  Date of onset: 02/11/25    Relevant medical history: Sleep disorder like apnea   Dates & types of surgery: mid [facility]    Prior diagnostic imaging/testing results: CT scan; X-ray     Prior therapy history for the same diagnosis, illness or injury: No      Prior Level of Function  Transfers:   Ambulation:   ADL:   IADL:     Living Environment  Social support: With family members   Type of home: House   Stairs to enter the home: Yes 6 Is there a railing: Yes     Ramp: No   Stairs inside the home: Yes 6 Is there a railing: Yes     Help at home: [employer] (home health aide/personal care attendant, family, etc)  Equipment owned:       Employment: Yes   Hobbies/Interests: being outside    Patient goals for therapy: walk with no assitance    Pain assessment: Pain present     Objective   FOOT/ANKLE EVALUATION  PAIN: Pain Level at Rest: 0/10  Pain Level with Use: 3/10  INTEGUMENTARY (edema, incisions):   POSTURE:   GAIT:   Weightbearing Status: TTWB  Assistive Device(s): CAM, Scooter  Gait Deviations: Antalgic  BALANCE/PROPRIOCEPTION:   WEIGHT BEARING ALIGNMENT:   NON-WEIGHTBEARING ALIGNMENT:    ROM:   (Degrees) Left AROM Left PROM  Right AROM Right PROM   Ankle Dorsiflexion   Lacking 10 deg from neutral    Ankle Plantarflexion   35 deg    Ankle Inversion       Ankle Eversion       Great Toe Flexion       Great Toe Extension       Pain:   End feel: Somewhat firm, tight mm    STRENGTH:   FLEXIBILITY:   SPECIAL TESTS:   FUNCTIONAL TESTS:   PALPATION:   JOINT MOBILITY:     Assessment & Plan   CLINICAL IMPRESSIONS  Medical Diagnosis: Closed fracture dislocation of right ankle, initial encounter (J23.861X)     Treatment Diagnosis: R ankle fx, ORIF, impaired WB, gait, mobility, and balance   Impression/Assessment:  Pt presents to PT with very mild pain in R ankle, impaired ROM and strength, restrictions in gait. Pt would benefit from skilled PT interventions in order to provide education, address any pain, provide appropriate HEP for strength and ROM gains, gait training once WB restrictions are lifted so pt may return to PLOF and independence for return to work and preferred leisure activities.    Clinical Decision Making (Complexity):  Clinical Presentation: Stable/Uncomplicated  Clinical Presentation Rationale: based on medical and personal factors listed in PT evaluation  Clinical Decision Making (Complexity): Low complexity    PLAN OF CARE  Treatment Interventions:  Interventions: Gait Training, Manual Therapy, Neuromuscular Re-education, Therapeutic Activity, Therapeutic Exercise    Long Term Goals     PT Goal 1  Goal Identifier: HEP  Goal Description: Pt will demo independence in performance and progression of strengthening and balance HEP in order to improve CLOF.  Target Date:  (Ongoing, updates as needed)  PT Goal 2  Goal Identifier: LEFS  Goal Description: Pt will demo improved leg and knee pain ratings and function as shown by increased LEFS score of at least 9 points in order to show significant improvement and greater return to previous function.  Goal Progress: x/80, not yet assessed (eval)  Target Date: 04/03/25  PT Goal 3  Goal Identifier: Gait  Goal Description: Pt will demo normalized gait pattern without AD over all household and community distances in order to increase independence and participation in all home, work, and preffered leisure activities.  Goal Progress: Pt utilizing knee scooter, TTWB (eval)  Target Date: 05/29/25      Frequency of Treatment: 1-2x/week  Duration of Treatment: 12 weeks    Recommended Referrals to Other Professionals:   Education Assessment:   Learner/Method:  Patient;Listening;Demonstration  Education Comments: Edema, recovery, ROM    Risks and benefits of evaluation/treatment have been explained.   Patient/Family/caregiver agrees with Plan of Care.     Evaluation Time:     PT Harpreet Low Complexity Minutes (43675): 15       Signing Clinician: Kvng Herrera PT

## 2025-03-10 ENCOUNTER — THERAPY VISIT (OUTPATIENT)
Dept: PHYSICAL THERAPY | Facility: CLINIC | Age: 45
End: 2025-03-10
Attending: PHYSICIAN ASSISTANT
Payer: COMMERCIAL

## 2025-03-10 DIAGNOSIS — S82.891A CLOSED FRACTURE DISLOCATION OF RIGHT ANKLE, INITIAL ENCOUNTER: Primary | ICD-10-CM

## 2025-03-10 PROCEDURE — 97110 THERAPEUTIC EXERCISES: CPT | Mod: GP | Performed by: PHYSICAL THERAPIST

## 2025-03-11 ENCOUNTER — OFFICE VISIT (OUTPATIENT)
Dept: ORTHOPEDICS | Facility: CLINIC | Age: 45
End: 2025-03-11
Payer: COMMERCIAL

## 2025-03-11 ENCOUNTER — ANCILLARY PROCEDURE (OUTPATIENT)
Dept: GENERAL RADIOLOGY | Facility: CLINIC | Age: 45
End: 2025-03-11
Attending: PHYSICIAN ASSISTANT
Payer: COMMERCIAL

## 2025-03-11 VITALS — TEMPERATURE: 97.3 F | WEIGHT: 201 LBS | HEIGHT: 71 IN | BODY MASS INDEX: 28.14 KG/M2

## 2025-03-11 DIAGNOSIS — Z98.890 HISTORY OF ANKLE SURGERY: ICD-10-CM

## 2025-03-11 DIAGNOSIS — Z98.890 HISTORY OF ANKLE SURGERY: Primary | ICD-10-CM

## 2025-03-11 PROCEDURE — 99024 POSTOP FOLLOW-UP VISIT: CPT | Performed by: PHYSICIAN ASSISTANT

## 2025-03-11 PROCEDURE — 73610 X-RAY EXAM OF ANKLE: CPT | Mod: TC | Performed by: RADIOLOGY

## 2025-03-11 PROCEDURE — 1126F AMNT PAIN NOTED NONE PRSNT: CPT | Performed by: PHYSICIAN ASSISTANT

## 2025-03-11 ASSESSMENT — PAIN SCALES - GENERAL: PAINLEVEL_OUTOF10: NO PAIN (0)

## 2025-03-11 NOTE — PROGRESS NOTES
"Orthopedic Clinic Post-Operative Note    CHIEF COMPLAINT:   Chief Complaint   Patient presents with    Surgical Followup     Open reduction and internal fixation right trimalleolar ankle fracture-dislocation DOS 2-11-25           HISTORY OF PRESENT ILLNESS  Location: Right ankle  Rating of Pain: Minimal to no pain  Pain is better with: Rest  Pain improving overall: Yes  Associated Features: Denies numbness or tingling shooting burning electric pain.  Denies any fevers chills nausea vomiting or any problems with the incisions.  Patient concerns: Wants to make sure that nothing else is wrong and wanting to know if he needs another CT scan to check tissues inside the ankle.  Wants to make sure he is progressing well.  Additional History: Patient was last seen on 2/25/2025 by myself and at that time patient was using a rolling walker and maintaining his touchdown weightbearing status.  Splint was removed and patient was put in a cam boot.  Education on range of motion 3-5 times a day.  At that time the patient was off oxycodone and Tylenol.  Physical therapy formally was starting 3/6/2025.  They are to work on range of motion and gentle strengthening.  Plan would be to see back today which is 2 weeks from my last visit and determine if the patient could weightbearing as tolerated.  Patient did put on a well supported work boot last Saturday ambulated in it with no problems and no pain.  Patient is doing weightbearing as tolerated at that point.    Patient's past medical, surgical, social and family histories reviewed.     REVIEW OF SYSTEMS  Review of systems negative other than positive findings in HPI.    Physical Exam:  Vitals: Temp 97.3  F (36.3  C) (Temporal)   Ht 1.803 m (5' 11\")   Wt 91.2 kg (201 lb)   BMI 28.03 kg/m    BMI= Body mass index is 28.03 kg/m .  Constitutional: healthy, alert and no acute distress   Psychiatric: mentation appears normal and affect normal/bright  NEURO: no focal deficits, CMS intact " right lower extremity   RESP: Normal with easy respirations and no use of accessory muscles to breathe, no audible wheezing or retractions  CV: Calf soft and nontender to palpation, leg warm, +2 dorsalis pedis pulse  SKIN: Slight erythema tint to the lower leg ankle and foot. No rashes, excoriation, or breakdown. No evidence of infection.  Incisions all healing well with no erythema no swelling no drainage.    MUSCULOSKELETAL:  INSPECTION of right ankle: Slight swelling noted throughout compared to the contralateral side.  No gross deformities, edema, ecchymosis, atrophy or fasciculations.   PALPATION: No tenderness medial or lateral malleolus or the calcaneus foot toes or Achilles tendon.   ROM: Passive: plantar flex to 25 and dorsal flex to 0 and able to invert and ana.  Moving all toes.  The range of motion is without catching locking or pain.    STRENGTH: 5 out of 5 plantar flexion and dorsiflexion as well as flexor hallucis longus and extensor hallucis longus without pain.   SPECIAL TEST: Negative squeeze test, negative drawer test, negative Austin's test, did not check Homans sign.    GAIT: Did not check.  Able to stand with no problem and put full weight on without any pain today.  Lymph: no palpable lymph nodes    Diagnostic Modalities:  Recent Results (from the past 744 hours)   XR Ankle Right 2 Views    Narrative    EXAM: XR ANKLE RIGHT 2 VIEWS  LOCATION: [facility]  DATE: 2/11/2025    INDICATION: injury,  COMPARISON: None.      Impression    IMPRESSION:     The talus is dislocated laterally and posteriorly relative to the distal tibia. There is an acute appearing, comminuted and displaced fracture of the distal fibula with apex volar and valgus angulation. There are also acute, displaced fractures of the   medial and posterior malleoli.    XR Ankle Right 1 View    Narrative    EXAM: XR ANKLE RIGHT 1 VIEW  LOCATION: [facility]  DATE:  2/11/2025    INDICATION: Ankle pain. Fracture.  COMPARISON: 2/11/2025 radiographs obtained at 0743 hours.      Impression    IMPRESSION: Acute fracture-dislocation of the right ankle with moderately displaced fractures of the medial and lateral malleoli and lateral dislocation of the talus. No significant interval change since the prior study.   Ankle XR, G/E 3 views, right    Narrative    EXAM: XR ANKLE RIGHT G/E 3 VIEWS  LOCATION: [facility]  DATE: 2/11/2025    INDICATION: SP reduction.  COMPARISON: Same-day radiograph.      Impression    IMPRESSION: Portable single-view assisted/stress radiograph shows improved alignment of the tibiotalar dislocation. The medial malleolus and distal fibula/lateral malleolus fracture alignment also has improved. Soft tissue swelling.   Ankle XR, G/E 3 views, right    Narrative    EXAM: XR ANKLE RIGHT G/E 3 VIEWS  LOCATION: [facility]  DATE: 2/11/2025    INDICATION: sp reduction  COMPARISON: 12/11/2025      Impression    IMPRESSION: Interval reduction of the fracture dislocation of the right ankle with improved alignment from initial prereduction radiographs, though there is increased lateral translation of the talus relative to the tibial plafond when compared to   pre-splinting post reduction radiographs earlier today. Displaced medial malleolar fracture fragment within the medial clear space. Mildly impacted and moderately displaced oblique fracture of the distal fibula with extension to the distal tibiofibular   syndesmosis. Splinting material about the right ankle. Soft tissue swelling.   CT Ankle Right w/o Contrast    Narrative    EXAM: CT ANKLE RIGHT W/O CONTRAST  LOCATION: [facility]  DATE: 2/11/2025    INDICATION: fracture dislocation  COMPARISON: Radiograph 2/11/2025  TECHNIQUE: Noncontrast. Axial, sagittal and coronal thin-section reconstruction. Dose reduction techniques were  used.     FINDINGS:     BONES:  -Reduced right ankle fracture dislocation. Mildly displaced posterior malleolus fracture with fracture fragment accounting for approximately 7 mm of the articular surface. There is 3 mm of posterior displacement and 2 mm of proximal displacement. There   are small adjacent fracture fragments. Displaced obliquely oriented fracture of the anteromedial tibia involving the medial metaphysis anteriorly and extending into the base of the medial malleolus. There is a 1 cm lateral displacement of the distal   fracture fragment which is distally displaced and rotated. There are small adjacent comminuted fracture fragments. Comminuted fracture of the distal fibula at the level of the tibial plafond with posterior and lateral displacement of the dominant distal   fracture fragment and apex anterior angulation. There is an additional posteriorly displaced butterfly fragment superiorly. Additional small comminuted fracture fragments are present.  -There is lateral and mild posterior subluxation of the talus at the tibiotalar joint.  -Small tibiotalar joint hemarthrosis which extravasates into the surrounding soft tissues.  -Joint spaces are otherwise preserved.  -Small type I accessory navicular bone.  -Benign bone island in the medial cuneiform.    SOFT TISSUES:  -Nonorganized blood products and stranding about the fractures.  -The medial malleolus fracture fragment comes in close proximity to the anterior tibialis tendon which otherwise appears normal.  -Tendons are grossly normal. No gross tendinous entrapment.  -Muscles are grossly intrinsically normal.      Impression    IMPRESSION:  1.  Reduced right ankle fracture dislocation with mildly displaced posterior malleolus fracture, displaced obliquely oriented fracture of the anteromedial tibia involving the medial metaphysis and base of the medial malleolus, and comminuted and   displaced fracture of the distal fibula at the level of the tibial  plafond.  2.  Persistent lateral and mild posterior subluxation of the talus at the tibiotalar joint.         XR Surgery ALEX L/T 5 Min Fluoro w Stills    Narrative    This exam was marked as non-reportable because it will not be read by a   radiologist or a [place] non-radiologist provider.         XR Ankle Right G/E 3 Views    Narrative    EXAM: XR ANKLE RIGHT G/E 3 VIEWS  LOCATION: [facility]  DATE: 2/25/2025    INDICATION:  Closed fracture of right ankle, initial encounter  COMPARISON: 2/11/2025      Impression    IMPRESSION: Interval reduction and internal fixation of distal fibular, medial malleolar, and posterior malleolar fractures. No instrumentation failure. The tibiotalar joint remains reduced. Right ankle soft tissue swelling is persistent.     I agree with the above readings.    X-rays done today showing good hardware placement no hardware failure no signs of loosening of the hardware.  Alignment has been unchanged from previous x-rays done on 2/25/2025.  Increased mineralization seen at both the medial malleolus and distal fibula on today's x-rays.    Independent visualization of the images was performed.      Impression: 1.  1 month and 1 day status post Open reduction internal fixation of right trimalleolar ankle fracture dislocation by Dr. Adolph Mckenzie     FOCUSED PLAN:   Patient is doing excellent without any pain.  Patient did try last Saturday weightbearing as tolerated in a work heavily supported boot with no pain.  Patient will now change to weightbearing as tolerated and he can transition from his cam boot into the work boot.  Plan to follow-up in 1 month and dispensed a soft ankle brace which she will use with a regular shoe at that point.  Patient is to continue physical therapy and I put a new order in for them to increase range of motion and strengthening and do gait training and that he is weightbearing as tolerated.  Most likely will see patient 1 more time  after the 1 month visit at the 3-month visit and if he is doing well he would follow-up as needed.  Follow-up 1 month.    Re-x-ray on return: Yes AP lateral and mortise view of right ankle outside of any brace.      This note was dictated with [employer].    George Funes PA-C

## 2025-03-11 NOTE — LETTER
3/11/2025      David Segura  [address]  [place] [address]      Dear Colleague,    Thank you for referring your patient, David Segura, to the [facility]. Please see a copy of my visit note below.    Orthopedic Clinic Post-Operative Note    CHIEF COMPLAINT:   Chief Complaint   Patient presents with     Surgical Followup     Open reduction and internal fixation right trimalleolar ankle fracture-dislocation DOS 2-11-25           HISTORY OF PRESENT ILLNESS  Location: Right ankle  Rating of Pain: Minimal to no pain  Pain is better with: Rest  Pain improving overall: Yes  Associated Features: Denies numbness or tingling shooting burning electric pain.  Denies any fevers chills nausea vomiting or any problems with the incisions.  Patient concerns: Wants to make sure that nothing else is wrong and wanting to know if he needs another CT scan to check tissues inside the ankle.  Wants to make sure he is progressing well.  Additional History: Patient was last seen on 2/25/2025 by myself and at that time patient was using a rolling walker and maintaining his touchdown weightbearing status.  Splint was removed and patient was put in a cam boot.  Education on range of motion 3-5 times a day.  At that time the patient was off oxycodone and Tylenol.  Physical therapy formally was starting 3/6/2025.  They are to work on range of motion and gentle strengthening.  Plan would be to see back today which is 2 weeks from my last visit and determine if the patient could weightbearing as tolerated.  Patient did put on a well supported work boot last Saturday ambulated in it with no problems and no pain.  Patient is doing weightbearing as tolerated at that point.    Patient's past medical, surgical, social and family histories reviewed.     REVIEW OF SYSTEMS  Review of systems negative other than positive findings in HPI.    Physical Exam:  Vitals: Temp 97.3  F (36.3  C) (Temporal)   Ht 1.803  "m (5' 11\")   Wt 91.2 kg (201 lb)   BMI 28.03 kg/m    BMI= Body mass index is 28.03 kg/m .  Constitutional: healthy, alert and no acute distress   Psychiatric: mentation appears normal and affect normal/bright  NEURO: no focal deficits, CMS intact right lower extremity   RESP: Normal with easy respirations and no use of accessory muscles to breathe, no audible wheezing or retractions  CV: Calf soft and nontender to palpation, leg warm, +2 dorsalis pedis pulse  SKIN: Slight erythema tint to the lower leg ankle and foot. No rashes, excoriation, or breakdown. No evidence of infection.  Incisions all healing well with no erythema no swelling no drainage.    MUSCULOSKELETAL:  INSPECTION of right ankle: Slight swelling noted throughout compared to the contralateral side.  No gross deformities, edema, ecchymosis, atrophy or fasciculations.   PALPATION: No tenderness medial or lateral malleolus or the calcaneus foot toes or Achilles tendon.   ROM: Passive: plantar flex to 25 and dorsal flex to 0 and able to invert and ana.  Moving all toes.  The range of motion is without catching locking or pain.    STRENGTH: 5 out of 5 plantar flexion and dorsiflexion as well as flexor hallucis longus and extensor hallucis longus without pain.   SPECIAL TEST: Negative squeeze test, negative drawer test, negative Austin's test, did not check Homans sign.    GAIT: Did not check.  Able to stand with no problem and put full weight on without any pain today.  Lymph: no palpable lymph nodes    Diagnostic Modalities:  Recent Results (from the past 744 hours)   XR Ankle Right 2 Views    Narrative    EXAM: XR ANKLE RIGHT 2 VIEWS  LOCATION: [facility]  DATE: 2/11/2025    INDICATION: injury,  COMPARISON: None.      Impression    IMPRESSION:     The talus is dislocated laterally and posteriorly relative to the distal tibia. There is an acute appearing, comminuted and displaced fracture of the distal fibula with " apex volar and valgus angulation. There are also acute, displaced fractures of the   medial and posterior malleoli.    XR Ankle Right 1 View    Narrative    EXAM: XR ANKLE RIGHT 1 VIEW  LOCATION: [facility]  DATE: 2/11/2025    INDICATION: Ankle pain. Fracture.  COMPARISON: 2/11/2025 radiographs obtained at 0743 hours.      Impression    IMPRESSION: Acute fracture-dislocation of the right ankle with moderately displaced fractures of the medial and lateral malleoli and lateral dislocation of the talus. No significant interval change since the prior study.   Ankle XR, G/E 3 views, right    Narrative    EXAM: XR ANKLE RIGHT G/E 3 VIEWS  LOCATION: [facility]  DATE: 2/11/2025    INDICATION: SP reduction.  COMPARISON: Same-day radiograph.      Impression    IMPRESSION: Portable single-view assisted/stress radiograph shows improved alignment of the tibiotalar dislocation. The medial malleolus and distal fibula/lateral malleolus fracture alignment also has improved. Soft tissue swelling.   Ankle XR, G/E 3 views, right    Narrative    EXAM: XR ANKLE RIGHT G/E 3 VIEWS  LOCATION: [facility]  DATE: 2/11/2025    INDICATION: sp reduction  COMPARISON: 12/11/2025      Impression    IMPRESSION: Interval reduction of the fracture dislocation of the right ankle with improved alignment from initial prereduction radiographs, though there is increased lateral translation of the talus relative to the tibial plafond when compared to   pre-splinting post reduction radiographs earlier today. Displaced medial malleolar fracture fragment within the medial clear space. Mildly impacted and moderately displaced oblique fracture of the distal fibula with extension to the distal tibiofibular   syndesmosis. Splinting material about the right ankle. Soft tissue swelling.   CT Ankle Right w/o Contrast    Narrative    EXAM: CT ANKLE RIGHT W/O  CONTRAST  LOCATION: [facility]  DATE: 2/11/2025    INDICATION: fracture dislocation  COMPARISON: Radiograph 2/11/2025  TECHNIQUE: Noncontrast. Axial, sagittal and coronal thin-section reconstruction. Dose reduction techniques were used.     FINDINGS:     BONES:  -Reduced right ankle fracture dislocation. Mildly displaced posterior malleolus fracture with fracture fragment accounting for approximately 7 mm of the articular surface. There is 3 mm of posterior displacement and 2 mm of proximal displacement. There   are small adjacent fracture fragments. Displaced obliquely oriented fracture of the anteromedial tibia involving the medial metaphysis anteriorly and extending into the base of the medial malleolus. There is a 1 cm lateral displacement of the distal   fracture fragment which is distally displaced and rotated. There are small adjacent comminuted fracture fragments. Comminuted fracture of the distal fibula at the level of the tibial plafond with posterior and lateral displacement of the dominant distal   fracture fragment and apex anterior angulation. There is an additional posteriorly displaced butterfly fragment superiorly. Additional small comminuted fracture fragments are present.  -There is lateral and mild posterior subluxation of the talus at the tibiotalar joint.  -Small tibiotalar joint hemarthrosis which extravasates into the surrounding soft tissues.  -Joint spaces are otherwise preserved.  -Small type I accessory navicular bone.  -Benign bone island in the medial cuneiform.    SOFT TISSUES:  -Nonorganized blood products and stranding about the fractures.  -The medial malleolus fracture fragment comes in close proximity to the anterior tibialis tendon which otherwise appears normal.  -Tendons are grossly normal. No gross tendinous entrapment.  -Muscles are grossly intrinsically normal.      Impression    IMPRESSION:  1.  Reduced right ankle fracture dislocation with  mildly displaced posterior malleolus fracture, displaced obliquely oriented fracture of the anteromedial tibia involving the medial metaphysis and base of the medial malleolus, and comminuted and   displaced fracture of the distal fibula at the level of the tibial plafond.  2.  Persistent lateral and mild posterior subluxation of the talus at the tibiotalar joint.         XR Surgery ALEX L/T 5 Min Fluoro w Stills    Narrative    This exam was marked as non-reportable because it will not be read by a   radiologist or a [place] non-radiologist provider.         XR Ankle Right G/E 3 Views    Narrative    EXAM: XR ANKLE RIGHT G/E 3 VIEWS  LOCATION: [facility]  DATE: 2/25/2025    INDICATION:  Closed fracture of right ankle, initial encounter  COMPARISON: 2/11/2025      Impression    IMPRESSION: Interval reduction and internal fixation of distal fibular, medial malleolar, and posterior malleolar fractures. No instrumentation failure. The tibiotalar joint remains reduced. Right ankle soft tissue swelling is persistent.     I agree with the above readings.    X-rays done today showing good hardware placement no hardware failure no signs of loosening of the hardware.  Alignment has been unchanged from previous x-rays done on 2/25/2025.  Increased mineralization seen at both the medial malleolus and distal fibula on today's x-rays.    Independent visualization of the images was performed.      Impression: 1.  1 month and 1 day status post Open reduction internal fixation of right trimalleolar ankle fracture dislocation by Dr. Adolph Mckenzie     FOCUSED PLAN:   Patient is doing excellent without any pain.  Patient did try last Saturday weightbearing as tolerated in a work heavily supported boot with no pain.  Patient will now change to weightbearing as tolerated and he can transition from his cam boot into the work boot.  Plan to follow-up in 1 month and dispensed a soft ankle brace which she will  use with a regular shoe at that point.  Patient is to continue physical therapy and I put a new order in for them to increase range of motion and strengthening and do gait training and that he is weightbearing as tolerated.  Most likely will see patient 1 more time after the 1 month visit at the 3-month visit and if he is doing well he would follow-up as needed.  Follow-up 1 month.    Re-x-ray on return: Yes AP lateral and mortise view of right ankle outside of any brace.      This note was dictated with [employer].    George Funes PA-C              Again, thank you for allowing me to participate in the care of your patient.        Sincerely,        George Funes PA-C    Electronically signed

## 2025-03-13 ENCOUNTER — THERAPY VISIT (OUTPATIENT)
Dept: PHYSICAL THERAPY | Facility: CLINIC | Age: 45
End: 2025-03-13
Attending: PHYSICIAN ASSISTANT
Payer: COMMERCIAL

## 2025-03-13 DIAGNOSIS — S82.891A CLOSED FRACTURE DISLOCATION OF RIGHT ANKLE, INITIAL ENCOUNTER: Primary | ICD-10-CM

## 2025-03-13 PROCEDURE — 97110 THERAPEUTIC EXERCISES: CPT | Mod: GP | Performed by: PHYSICAL THERAPIST

## 2025-03-17 ENCOUNTER — THERAPY VISIT (OUTPATIENT)
Dept: PHYSICAL THERAPY | Facility: CLINIC | Age: 45
End: 2025-03-17
Attending: PHYSICIAN ASSISTANT
Payer: COMMERCIAL

## 2025-03-17 DIAGNOSIS — S82.891A CLOSED FRACTURE DISLOCATION OF RIGHT ANKLE, INITIAL ENCOUNTER: Primary | ICD-10-CM

## 2025-03-17 PROCEDURE — 97110 THERAPEUTIC EXERCISES: CPT | Mod: GP | Performed by: PHYSICAL THERAPIST

## 2025-03-17 PROCEDURE — 97140 MANUAL THERAPY 1/> REGIONS: CPT | Mod: GP | Performed by: PHYSICAL THERAPIST

## 2025-03-19 ENCOUNTER — TELEPHONE (OUTPATIENT)
Dept: ORTHOPEDICS | Facility: CLINIC | Age: 45
End: 2025-03-19
Payer: COMMERCIAL

## 2025-03-19 NOTE — TELEPHONE ENCOUNTER
"Per office visit note from 3/11/25:    \"Plan to follow-up in 1 month and dispensed a soft ankle brace which she will use with a regular shoe at that point.\"    Attempted to call patient to clarify if brace was provided at his last visit with JENNIFER Zepeda and he needs a replacement or if he was not provided at this visit and he would like to get one from us now. No answer, left voicemail and requested patient call back at [phone].       When patient returns call, please clarify above information and schedule for a nurse only visit for brace fitting if needed.     Madeleine Sellers RN   [facility]  "

## 2025-03-19 NOTE — TELEPHONE ENCOUNTER
Patient returned call, patient states he didn't receive an ankle brace at the time of his visit, he is scheduled on the specialty RN schedule on 3/20/25 to  a brace

## 2025-03-19 NOTE — TELEPHONE ENCOUNTER
Pt wants to know if he able to get his ankle brace tomorrow, will be there doing PT  between 7am-8:30am    Could we send this information to you in [place] or would you prefer to receive a phone call?:   Patient would prefer a phone call   Okay to leave a detailed message?: Yes at Cell number on file:    Telephone Information:   Mobile [phone]

## 2025-03-20 ENCOUNTER — ALLIED HEALTH/NURSE VISIT (OUTPATIENT)
Dept: FAMILY MEDICINE | Facility: CLINIC | Age: 45
End: 2025-03-20
Payer: COMMERCIAL

## 2025-03-20 ENCOUNTER — THERAPY VISIT (OUTPATIENT)
Dept: PHYSICAL THERAPY | Facility: CLINIC | Age: 45
End: 2025-03-20
Attending: PHYSICIAN ASSISTANT
Payer: COMMERCIAL

## 2025-03-20 DIAGNOSIS — S82.891A CLOSED FRACTURE DISLOCATION OF RIGHT ANKLE, INITIAL ENCOUNTER: Primary | ICD-10-CM

## 2025-03-20 DIAGNOSIS — S82.891A CLOSED FRACTURE OF RIGHT ANKLE, INITIAL ENCOUNTER: Primary | ICD-10-CM

## 2025-03-20 PROCEDURE — 97110 THERAPEUTIC EXERCISES: CPT | Mod: GP | Performed by: PHYSICAL THERAPIST

## 2025-03-20 NOTE — PROGRESS NOTES
DME    Dispensed one  ankle brace , size large, with [facility] agreement signed by patient.    Madeleine Sellers RN   [facility]

## 2025-03-24 ENCOUNTER — THERAPY VISIT (OUTPATIENT)
Dept: PHYSICAL THERAPY | Facility: CLINIC | Age: 45
End: 2025-03-24
Attending: PHYSICIAN ASSISTANT
Payer: COMMERCIAL

## 2025-03-24 DIAGNOSIS — S82.891A CLOSED FRACTURE DISLOCATION OF RIGHT ANKLE, INITIAL ENCOUNTER: Primary | ICD-10-CM

## 2025-03-24 PROCEDURE — 97110 THERAPEUTIC EXERCISES: CPT | Mod: GP | Performed by: PHYSICAL THERAPIST

## 2025-03-24 PROCEDURE — 97140 MANUAL THERAPY 1/> REGIONS: CPT | Mod: GP | Performed by: PHYSICAL THERAPIST

## 2025-03-24 PROCEDURE — 97112 NEUROMUSCULAR REEDUCATION: CPT | Mod: GP | Performed by: PHYSICAL THERAPIST

## 2025-03-27 ENCOUNTER — THERAPY VISIT (OUTPATIENT)
Dept: PHYSICAL THERAPY | Facility: CLINIC | Age: 45
End: 2025-03-27
Attending: PHYSICIAN ASSISTANT
Payer: COMMERCIAL

## 2025-03-27 DIAGNOSIS — S82.891A CLOSED FRACTURE DISLOCATION OF RIGHT ANKLE, INITIAL ENCOUNTER: Primary | ICD-10-CM

## 2025-03-27 PROCEDURE — 97112 NEUROMUSCULAR REEDUCATION: CPT | Mod: GP | Performed by: PHYSICAL THERAPIST

## 2025-03-27 PROCEDURE — 97110 THERAPEUTIC EXERCISES: CPT | Mod: GP | Performed by: PHYSICAL THERAPIST

## 2025-03-31 ENCOUNTER — THERAPY VISIT (OUTPATIENT)
Dept: PHYSICAL THERAPY | Facility: CLINIC | Age: 45
End: 2025-03-31
Attending: PHYSICIAN ASSISTANT
Payer: COMMERCIAL

## 2025-03-31 DIAGNOSIS — S82.891A CLOSED FRACTURE DISLOCATION OF RIGHT ANKLE, INITIAL ENCOUNTER: Primary | ICD-10-CM

## 2025-03-31 PROCEDURE — 97110 THERAPEUTIC EXERCISES: CPT | Mod: GP | Performed by: PHYSICAL THERAPIST

## 2025-03-31 PROCEDURE — 97112 NEUROMUSCULAR REEDUCATION: CPT | Mod: GP | Performed by: PHYSICAL THERAPIST

## 2025-04-03 ENCOUNTER — THERAPY VISIT (OUTPATIENT)
Dept: PHYSICAL THERAPY | Facility: CLINIC | Age: 45
End: 2025-04-03
Attending: PHYSICIAN ASSISTANT
Payer: COMMERCIAL

## 2025-04-03 DIAGNOSIS — S82.891A CLOSED FRACTURE DISLOCATION OF RIGHT ANKLE, INITIAL ENCOUNTER: Primary | ICD-10-CM

## 2025-04-03 PROCEDURE — 97110 THERAPEUTIC EXERCISES: CPT | Mod: GP | Performed by: PHYSICAL THERAPIST

## 2025-04-03 PROCEDURE — 97112 NEUROMUSCULAR REEDUCATION: CPT | Mod: GP | Performed by: PHYSICAL THERAPIST

## 2025-04-07 ENCOUNTER — THERAPY VISIT (OUTPATIENT)
Dept: PHYSICAL THERAPY | Facility: CLINIC | Age: 45
End: 2025-04-07
Attending: PHYSICIAN ASSISTANT
Payer: COMMERCIAL

## 2025-04-07 ENCOUNTER — OFFICE VISIT (OUTPATIENT)
Dept: ORTHOPEDICS | Facility: CLINIC | Age: 45
End: 2025-04-07
Payer: COMMERCIAL

## 2025-04-07 ENCOUNTER — ANCILLARY PROCEDURE (OUTPATIENT)
Dept: GENERAL RADIOLOGY | Facility: CLINIC | Age: 45
End: 2025-04-07
Attending: PHYSICIAN ASSISTANT
Payer: COMMERCIAL

## 2025-04-07 VITALS — WEIGHT: 200 LBS | BODY MASS INDEX: 28 KG/M2 | HEIGHT: 71 IN | TEMPERATURE: 96.9 F

## 2025-04-07 DIAGNOSIS — S82.891A CLOSED FRACTURE DISLOCATION OF RIGHT ANKLE, INITIAL ENCOUNTER: ICD-10-CM

## 2025-04-07 DIAGNOSIS — M77.8 BILATERAL ELBOW TENDONITIS: ICD-10-CM

## 2025-04-07 DIAGNOSIS — M77.8 TENDONITIS OF BOTH SHOULDERS: ICD-10-CM

## 2025-04-07 DIAGNOSIS — S82.891D CLOSED FRACTURE DISLOCATION OF RIGHT ANKLE WITH ROUTINE HEALING, SUBSEQUENT ENCOUNTER: ICD-10-CM

## 2025-04-07 DIAGNOSIS — S82.891A CLOSED FRACTURE DISLOCATION OF RIGHT ANKLE, INITIAL ENCOUNTER: Primary | ICD-10-CM

## 2025-04-07 DIAGNOSIS — Z98.890 HISTORY OF ANKLE SURGERY: Primary | ICD-10-CM

## 2025-04-07 DIAGNOSIS — Z98.890 HISTORY OF ANKLE SURGERY: ICD-10-CM

## 2025-04-07 PROCEDURE — 1126F AMNT PAIN NOTED NONE PRSNT: CPT | Performed by: PHYSICIAN ASSISTANT

## 2025-04-07 PROCEDURE — 97110 THERAPEUTIC EXERCISES: CPT | Mod: GP | Performed by: PHYSICAL THERAPIST

## 2025-04-07 PROCEDURE — 99024 POSTOP FOLLOW-UP VISIT: CPT | Performed by: PHYSICIAN ASSISTANT

## 2025-04-07 PROCEDURE — 73610 X-RAY EXAM OF ANKLE: CPT | Mod: TC | Performed by: INTERNAL MEDICINE

## 2025-04-07 PROCEDURE — 97112 NEUROMUSCULAR REEDUCATION: CPT | Mod: GP | Performed by: PHYSICAL THERAPIST

## 2025-04-07 ASSESSMENT — PAIN SCALES - GENERAL: PAINLEVEL_OUTOF10: NO PAIN (0)

## 2025-04-07 NOTE — PROGRESS NOTES
PLAN  Continue therapy per current plan of care.  Pt is making good progress in his strength and mobility, remains weak through his hips and ankle with desire to continue improving his function for return to sporting activities this summer and into winter. Pt would benefit from continued PT interventions to progress his strength, mobility, balance, and sport specific training for safe and injury free return to his preferred activities.    Beginning/End Dates of Progress Note Reporting Period:  03/06/25 to 04/07/2025    Referring Provider:  George Funes PA-C       04/07/25 0500   Appointment Info   Signing clinician's name / credentials Kvng Herrera PT   Total/Authorized Visits 10/10   Visits Used 10   Medical Diagnosis Closed fracture dislocation of right ankle, initial encounter (S82.148A)   PT Tx Diagnosis R ankle fx, ORIF, impaired WB, gait, mobility, and balance   Precautions/Limitations WBAT in [employer] work boot, progress PT   Progress Note/Certification   Onset of illness/injury or Date of Surgery 02/11/25   Therapy Frequency 1-2x/week   Predicted Duration 12 weeks   Progress Note Completed Date 03/06/25   GOALS   PT Goals 2;3;4;5   PT Goal 1   Goal Identifier HEP   Goal Description Pt will demo independence in performance and progression of strengthening and balance HEP in order to improve CLOF.   Target Date   (Ongoing, updates as needed)   PT Goal 2   Goal Identifier LEFS   Goal Description Pt will demo improved leg and knee pain ratings and function as shown by increased LEFS score of at least 9 points in order to show significant improvement and greater return to previous function.   Goal Progress 51/80, continue goal   Target Date 05/01/25   PT Goal 3   Goal Identifier Gait   Goal Description Pt will demo normalized gait pattern without AD over all household and community distances in order to increase independence and participation in all home, work, and preffered leisure activities.   Goal Progress  Pt walking with sturdy boot support to best manage his ankle weakness, tolerating increasing community distances, continue goal.   Target Date 05/29/25   PT Goal 4   Goal Identifier Bike   Goal Description Pt will progress to normal comfortable biking in order to improve his cardiovascular health with pt self report of ability to perform near previous baseline without ankle pain.   Goal Progress Pt not yet trialling biking.   Target Date 06/02/25   PT Goal 5   Goal Identifier Running   Goal Description Pt will demo ability to run over short distances, around 5k, without c/o pain and with good overall tolerance to activity for training and return to [employer] skiing once winter returns.   Goal Progress Pt not yet jogging, remains limited in his walking ability and tolerance.   Target Date 06/16/25   Subjective Report   Subjective Report Pt reports backing off some of his exercises and activity and is reporting less pain through lateral ankle. Pt arrives in regular sneakers with ankle brace for support.   Objective Measures   Objective Measures Objective Measure 1   Treatment Interventions (PT)   Interventions Neuromuscular Re-education;Manual Therapy;Therapeutic Procedure/Exercise   Therapeutic Procedure/Exercise   Therapeutic Procedures: strength, endurance, ROM, flexibility minutes (23602) 10   Therapeutic Procedures Ther Proc 2   Ther Proc 1 HEP reivew and progression   Ther Proc 1 - Details Reviewed sidelying SLR, pt with some difficulty in setup and abductor activation, having some adductor cocontraction. Pt eventually getting a feel for it. Performs x15 reps B with hands on cuing. Sidelying piriformis crunchers for further hip strength, pt needing frequent hands on corrections for positioning and performance, able to do 12 reps B.   Skilled Intervention Review, instruction   Patient Response/Progress Pt well challenged in new exercises.   Neuromuscular Re-education   Neuromuscular re-ed of mvmt, balance, coord,  kinesthetic sense, posture, proprioception minutes (32121) 35   Neuro Re-ed 1 Balance for strength   Neuro Re-ed 1 - Details Reviewed balance tasks in normal sneakers and ankle brace. Pt performs SLS with ongoing weakness limiting performance, more challenging with reduced ankle support. Tandem stance R & L, notes greatest stability with L foot behind. Tilt board lateral with pt noting good stretch as L foot goes down, educated in using tilt board for balance and stretch exercise. AP rocking, pt notes pull through DF and ant ankle. BOSU solid side with dowel perturbations, pt more challenged on R side. Playing catch with 6# med ball, pt well challenged in shifting out of ALEJO. Agility ladder for improved stability on his feet, 1 minute 12 sec  at 25%, 58.4 sec at 50%, and 43.6% at 75% ability.   Skilled Intervention Selection, progression   Patient Response/Progress Pt well challenged in higher level exercises with reduced foot and ankle support.   Education   Learner/Method Patient;Listening;Demonstration   Plan   Home program Ankle strength and ROM   Updates to plan of care Foot intrinsics, ongoing progression fo balance for strength   Plan for next session [employer] bike and treadmill for mobility.   Total Session Time   Timed Code Treatment Minutes 45   Total Treatment Time (sum of timed and untimed services) 45

## 2025-04-07 NOTE — PROGRESS NOTES
"Orthopedic Clinic Post-Operative Note    CHIEF COMPLAINT:   Chief Complaint   Patient presents with    RECHECK     1.  1 month and 1 day status post Open reduction internal fixation of right trimalleolar ankle fracture dislocation by Dr. Adolph Mckenzie             HISTORY OF PRESENT ILLNESS  Location: Right ankle  Rating of Pain: Minimal  Pain is better with: Rest   Pain improving overall:definitely  Associated Features: Denies shooting burning or electric pain.  Patient concerns: Wants to make sure his ankle is coming along well.  Additional History: Patient was last seen on 3/11/2025 by self and at that time patient was deemed okay to do weightbearing as tolerated and wean from the cam boot to a working boot.  Patient also to come back in 1 month and get a soft ankle brace to wear with her normal shoe.  Patient had another order of physical therapy and was to continue patient did get a soft physical therapy working on gait training range of motion strengthening.  Plan was for 1 more follow-up after that at the 3-month after surgery follow-up.    Patient's past medical, surgical, social and family histories reviewed.     REVIEW OF SYSTEMS  Review of systems negative other than positive findings in HPI.    Physical Exam:  Vitals: Temp 96.9  F (36.1  C) (Temporal)   Ht 1.803 m (5' 11\")   Wt 90.7 kg (200 lb)   BMI 27.89 kg/m    BMI= Body mass index is 27.89 kg/m .  Constitutional: healthy, alert and no acute distress   Psychiatric: mentation appears normal and affect normal/bright  NEURO: no focal deficits, CMS intact right lower extremity   RESP: Normal with easy respirations and no use of accessory muscles to breathe, no audible wheezing or retractions  CV: Calf soft and nontender to palpation, leg warm, +2 dorsalis pedis pulse  SKIN: No erythema, rashes, excoriation, or breakdown. No evidence of infection.  Bilateral incisions with no erythema swelling or drainage and completely closed.  No " ecchymosis.  MUSCULOSKELETAL:  INSPECTION of right ankle: Slight swelling when compared to the contralateral side.  No gross deformities, erythema, edema, ecchymosis, atrophy or fasciculations.   PALPATION: Tenderness to palpation of the lateral malleolus.  No tenderness medial malleolus or the calcaneus foot toes or Achilles tendon.    ROM: Passive: plantar flex to 35 and dorsal flex to 5.  The range of motion is without catching locking or pain.    STRENGTH: 5 out of 5 plantar flexion and dorsiflexion as well as flexor hallucis longus and extensor hallucis longus without pain.   SPECIAL TEST: negative drawer test  GAIT: non-antalgic  Lymph: no palpable lymph nodes    Diagnostic Modalities:  Recent Results (from the past 744 hours)   XR Ankle Right G/E 3 Views    Narrative    EXAM: XR ANKLE RIGHT G/E 3 VIEWS  LOCATION: [facility]  DATE: 3/11/2025    INDICATION:  History of ankle surgery  COMPARISON: Radiographs 02/25/2025; CT 02/11/2025      Impression    IMPRESSION: ORIF of trimalleolar fracture of the right ankle with plate and screw fixation of the distal fibula and screw fixation of the medial malleolus and tibial plafond. Alignment is anatomic. The fracture lines are faintly visible. No hardware   failure. Soft tissue swelling about the ankle.     I agree with the above readings.    X-rays done today showing excellent hardware placement no hardware failure and no change in x-rays from previous x-rays done 3/11/2025.  Increased mineralization seen both medial and lateral.  No other fracture dislocation or tumor.  Mortise view is equal and congruent throughout.    Independent visualization of the images was performed.      Impression: 1. 1 month and 28 days status post Open reduction internal fixation of right trimalleolar ankle fracture dislocation by Dr. Farfan     FOCUSED PLAN:   Patient doing excellent today.  He did receive a soft ankle brace about 2 weeks ago when he was  going on a trip.  Patient doing formal physical therapy and very happy with that.  Is working on a treadmill now.  Patient is icing at night which I encouraged him to continue as it is helping him.  Patient not requiring any pain medication.  Order for physical therapy written to continue physical therapy on the ankle but also added bilateral shoulder and elbow as he has a little bit of tendinitis for compensating and using crutches.  Follow-up in 1 month and at that time if everything looks good that should be his last visit.    Re-x-ray on return: Yes AP lateral and mortise view of the right ankle      This note was dictated with [employer].    George Funes PA-C

## 2025-04-07 NOTE — LETTER
"4/7/2025      David Segura  [address]  [place] [address]      Dear Colleague,    Thank you for referring your patient, David Segura, to the [facility]. Please see a copy of my visit note below.    Orthopedic Clinic Post-Operative Note    CHIEF COMPLAINT:   Chief Complaint   Patient presents with     RECHECK     1.  1 month and 1 day status post Open reduction internal fixation of right trimalleolar ankle fracture dislocation by Dr. Adolph Mckenzie             HISTORY OF PRESENT ILLNESS  Location: Right ankle  Rating of Pain: Minimal  Pain is better with: Rest   Pain improving overall:definitely  Associated Features: Denies shooting burning or electric pain.  Patient concerns: Wants to make sure his ankle is coming along well.  Additional History: Patient was last seen on 3/11/2025 by self and at that time patient was deemed okay to do weightbearing as tolerated and wean from the cam boot to a working boot.  Patient also to come back in 1 month and get a soft ankle brace to wear with her normal shoe.  Patient had another order of physical therapy and was to continue patient did get a soft physical therapy working on gait training range of motion strengthening.  Plan was for 1 more follow-up after that at the 3-month after surgery follow-up.    Patient's past medical, surgical, social and family histories reviewed.     REVIEW OF SYSTEMS  Review of systems negative other than positive findings in HPI.    Physical Exam:  Vitals: Temp 96.9  F (36.1  C) (Temporal)   Ht 1.803 m (5' 11\")   Wt 90.7 kg (200 lb)   BMI 27.89 kg/m    BMI= Body mass index is 27.89 kg/m .  Constitutional: healthy, alert and no acute distress   Psychiatric: mentation appears normal and affect normal/bright  NEURO: no focal deficits, CMS intact right lower extremity   RESP: Normal with easy respirations and no use of accessory muscles to breathe, no audible wheezing or retractions  CV: Calf soft and " nontender to palpation, leg warm, +2 dorsalis pedis pulse  SKIN: No erythema, rashes, excoriation, or breakdown. No evidence of infection.  Bilateral incisions with no erythema swelling or drainage and completely closed.  No ecchymosis.  MUSCULOSKELETAL:  INSPECTION of right ankle: Slight swelling when compared to the contralateral side.  No gross deformities, erythema, edema, ecchymosis, atrophy or fasciculations.   PALPATION: Tenderness to palpation of the lateral malleolus.  No tenderness medial malleolus or the calcaneus foot toes or Achilles tendon.    ROM: Passive: plantar flex to 35 and dorsal flex to 5.  The range of motion is without catching locking or pain.    STRENGTH: 5 out of 5 plantar flexion and dorsiflexion as well as flexor hallucis longus and extensor hallucis longus without pain.   SPECIAL TEST: negative drawer test  GAIT: non-antalgic  Lymph: no palpable lymph nodes    Diagnostic Modalities:  Recent Results (from the past 744 hours)   XR Ankle Right G/E 3 Views    Narrative    EXAM: XR ANKLE RIGHT G/E 3 VIEWS  LOCATION: [facility]  DATE: 3/11/2025    INDICATION:  History of ankle surgery  COMPARISON: Radiographs 02/25/2025; CT 02/11/2025      Impression    IMPRESSION: ORIF of trimalleolar fracture of the right ankle with plate and screw fixation of the distal fibula and screw fixation of the medial malleolus and tibial plafond. Alignment is anatomic. The fracture lines are faintly visible. No hardware   failure. Soft tissue swelling about the ankle.     I agree with the above readings.    X-rays done today showing excellent hardware placement no hardware failure and no change in x-rays from previous x-rays done 3/11/2025.  Increased mineralization seen both medial and lateral.  No other fracture dislocation or tumor.  Mortise view is equal and congruent throughout.    Independent visualization of the images was performed.      Impression: 1. 1 month and 28 days  status post Open reduction internal fixation of right trimalleolar ankle fracture dislocation by Dr. Farfan     FOCUSED PLAN:   Patient doing excellent today.  He did receive a soft ankle brace about 2 weeks ago when he was going on a trip.  Patient doing formal physical therapy and very happy with that.  Is working on a treadmill now.  Patient is icing at night which I encouraged him to continue as it is helping him.  Patient not requiring any pain medication.  Order for physical therapy written to continue physical therapy on the ankle but also added bilateral shoulder and elbow as he has a little bit of tendinitis for compensating and using crutches.  Follow-up in 1 month and at that time if everything looks good that should be his last visit.    Re-x-ray on return: Yes AP lateral and mortise view of the right ankle      This note was dictated with [employer].    George Funes PA-C              Again, thank you for allowing me to participate in the care of your patient.        Sincerely,        George Funes PA-C    Electronically signed

## 2025-04-10 ENCOUNTER — THERAPY VISIT (OUTPATIENT)
Dept: PHYSICAL THERAPY | Facility: CLINIC | Age: 45
End: 2025-04-10
Attending: PHYSICIAN ASSISTANT
Payer: COMMERCIAL

## 2025-04-10 DIAGNOSIS — M77.8 TENDONITIS OF BOTH SHOULDERS: ICD-10-CM

## 2025-04-10 DIAGNOSIS — M77.8 BILATERAL ELBOW TENDONITIS: ICD-10-CM

## 2025-04-10 DIAGNOSIS — S82.891A CLOSED FRACTURE DISLOCATION OF RIGHT ANKLE, INITIAL ENCOUNTER: Primary | ICD-10-CM

## 2025-04-10 DIAGNOSIS — Z98.890 HISTORY OF ANKLE SURGERY: ICD-10-CM

## 2025-04-10 DIAGNOSIS — S82.891D CLOSED FRACTURE DISLOCATION OF RIGHT ANKLE WITH ROUTINE HEALING, SUBSEQUENT ENCOUNTER: ICD-10-CM

## 2025-04-10 PROCEDURE — 97112 NEUROMUSCULAR REEDUCATION: CPT | Mod: GP | Performed by: PHYSICAL THERAPIST

## 2025-04-10 PROCEDURE — 97116 GAIT TRAINING THERAPY: CPT | Mod: GP | Performed by: PHYSICAL THERAPIST

## 2025-04-10 PROCEDURE — 97110 THERAPEUTIC EXERCISES: CPT | Mod: GP | Performed by: PHYSICAL THERAPIST

## 2025-04-14 ENCOUNTER — THERAPY VISIT (OUTPATIENT)
Dept: PHYSICAL THERAPY | Facility: CLINIC | Age: 45
End: 2025-04-14
Attending: PHYSICIAN ASSISTANT
Payer: COMMERCIAL

## 2025-04-14 DIAGNOSIS — S82.891A CLOSED FRACTURE DISLOCATION OF RIGHT ANKLE, INITIAL ENCOUNTER: Primary | ICD-10-CM

## 2025-04-14 PROCEDURE — 97140 MANUAL THERAPY 1/> REGIONS: CPT | Mod: GP | Performed by: PHYSICAL THERAPIST

## 2025-04-14 PROCEDURE — 97110 THERAPEUTIC EXERCISES: CPT | Mod: GP | Performed by: PHYSICAL THERAPIST

## 2025-04-17 ENCOUNTER — THERAPY VISIT (OUTPATIENT)
Dept: PHYSICAL THERAPY | Facility: CLINIC | Age: 45
End: 2025-04-17
Attending: PHYSICIAN ASSISTANT
Payer: COMMERCIAL

## 2025-04-17 DIAGNOSIS — S82.891A CLOSED FRACTURE DISLOCATION OF RIGHT ANKLE, INITIAL ENCOUNTER: Primary | ICD-10-CM

## 2025-04-17 PROCEDURE — 97112 NEUROMUSCULAR REEDUCATION: CPT | Mod: GP | Performed by: PHYSICAL THERAPIST

## 2025-04-17 PROCEDURE — 97140 MANUAL THERAPY 1/> REGIONS: CPT | Mod: GP | Performed by: PHYSICAL THERAPIST

## 2025-04-21 ENCOUNTER — THERAPY VISIT (OUTPATIENT)
Dept: PHYSICAL THERAPY | Facility: CLINIC | Age: 45
End: 2025-04-21
Attending: PHYSICIAN ASSISTANT
Payer: COMMERCIAL

## 2025-04-21 DIAGNOSIS — S82.891A CLOSED FRACTURE DISLOCATION OF RIGHT ANKLE, INITIAL ENCOUNTER: Primary | ICD-10-CM

## 2025-04-21 PROCEDURE — 97110 THERAPEUTIC EXERCISES: CPT | Mod: GP | Performed by: PHYSICAL THERAPIST

## 2025-04-28 ENCOUNTER — THERAPY VISIT (OUTPATIENT)
Dept: PHYSICAL THERAPY | Facility: CLINIC | Age: 45
End: 2025-04-28
Attending: PHYSICIAN ASSISTANT
Payer: COMMERCIAL

## 2025-04-28 DIAGNOSIS — S82.891A CLOSED FRACTURE DISLOCATION OF RIGHT ANKLE, INITIAL ENCOUNTER: Primary | ICD-10-CM

## 2025-04-28 PROCEDURE — 97110 THERAPEUTIC EXERCISES: CPT | Mod: GP | Performed by: PHYSICAL THERAPIST

## 2025-07-12 ENCOUNTER — HEALTH MAINTENANCE LETTER (OUTPATIENT)
Age: 45
End: 2025-07-12

## 2025-07-14 ENCOUNTER — THERAPY VISIT (OUTPATIENT)
Dept: PHYSICAL THERAPY | Facility: CLINIC | Age: 45
End: 2025-07-14
Attending: PHYSICIAN ASSISTANT
Payer: COMMERCIAL

## 2025-07-14 DIAGNOSIS — S82.891A CLOSED FRACTURE DISLOCATION OF RIGHT ANKLE, INITIAL ENCOUNTER: Primary | ICD-10-CM

## 2025-07-14 PROCEDURE — 97110 THERAPEUTIC EXERCISES: CPT | Mod: GP | Performed by: PHYSICAL THERAPIST

## 2025-07-14 PROCEDURE — 97530 THERAPEUTIC ACTIVITIES: CPT | Mod: GP | Performed by: PHYSICAL THERAPIST

## 2025-07-14 NOTE — PROGRESS NOTES
DISCHARGE  Reason for Discharge: Patient has met all goals.  Pt is managing HEP well, has shown very good progress with good strength gains, continues to make slow gains toward previous baseline, but is fully functional in all work and recreational activities.    Equipment Issued:     Discharge Plan: Patient to continue home program.    Referring Provider:  George Funes PA-C       07/14/25 0500   Appointment Info   Signing clinician's name / credentials Kvng Herrera PT   Total/Authorized Visits 9/10   Visits Used 9   Medical Diagnosis Closed fracture dislocation of right ankle, initial encounter (S82.893O)   PT Tx Diagnosis R ankle fx, ORIF, impaired WB, gait, mobility, and balance   Precautions/Limitations WBAT in sturdy work boot, progress PT   Progress Note/Certification   Onset of illness/injury or Date of Surgery 02/11/25   Therapy Frequency 1-2x/week   Predicted Duration 12 weeks   Progress Note Completed Date 03/06/25   GOALS   PT Goals 2;3;4;5   PT Goal 1   Goal Identifier HEP   Goal Description Pt will demo independence in performance and progression of strengthening and balance HEP in order to improve CLOF.   Goal Progress Pt shows independence in managing, progressing his HEP.   Target Date   (Ongoing, updates as needed)   Date Met 04/21/25   PT Goal 2   Goal Identifier LEFS   Goal Description Pt will demo improved leg and knee pain ratings and function as shown by increased LEFS score of at least 9 points in order to show significant improvement and greater return to previous function.   Goal Progress 68/80   Target Date 05/01/25   Date Met 05/16/25   PT Goal 3   Goal Identifier Gait   Goal Description Pt will demo normalized gait pattern without AD over all household and community distances in order to increase independence and participation in all home, work, and preffered leisure activities.   Goal Progress Pt is walking over all community and worksite distances with sturdy work boots or  supportive shoe gear without complaint.   Target Date 05/29/25   Date Met 05/16/25   PT Goal 4   Goal Identifier Bike   Goal Description Pt will progress to normal comfortable biking in order to improve his cardiovascular health with pt self report of ability to perform near previous baseline without ankle pain.   Goal Progress Pt has transitioned to biking, tolerating 8-10 miles comfortably with no stiffness in ankle, no pain complaints with activity.   Target Date 06/02/25   Date Met 05/16/25   PT Goal 5   Goal Identifier Running   Goal Description Pt will demo ability to run over short distances, around 5k, without c/o pain and with good overall tolerance to activity for training and return to [employer] skiing once winter returns.   Goal Progress Pt not yet jogging, will likely not return to running for some time.   Target Date 06/16/25   Subjective Report   Subjective Report Pt reports mobility is good, stiff in the morning. Pt states biking has been going well, has not been running. Nothing has changed as far as work mobility. Pt denies lasting pain or fatigue.   Objective Measures   Objective Measures Objective Measure 1   Objective Measure 1   Objective Measure AROM   Details DF = 8 deg   Treatment Interventions (PT)   Interventions Therapeutic Activity   Therapeutic Procedure/Exercise   Therapeutic Procedures: strength, endurance, ROM, flexibility minutes (57209) 20   Ther Proc 1 HEP update   Ther Proc 1 - Details Instructed in additional heel raises, peroneal and post tib bias for further ankle strength and mobility. Pt noting some weakness in peroneals. Reminded to continue stretching heel cord. Reminded to continue mixing in all exercises for variety of strength challenges. Pt managing his exercises and recovery well over the past 6 weeks.   Skilled Intervention Brief HEP update, review of ongoing mgmt of rehab.   Patient Response/Progress Pt receptive to all education, performing new exercises well.    Therapeutic Activity   Therapeutic Activities: dynamic activities to improve functional performance minutes (62312) 10   Ther Act 1 Compression   Ther Act 1 - Details Discussed compression socks to help with ongoing edema, pt understanding of education and agreeable to updating footwear.   Skilled Intervention Education   Patient Response/Progress Pt receptive to all education.   Education   Learner/Method Patient;Listening;Demonstration   Plan   Home program Ankle strength and ROM   Updates to plan of care Core strength   Plan for next session Pt managing well on his own, brief HEP update. D/C skilled PT.   Total Session Time   Timed Code Treatment Minutes 30   Total Treatment Time (sum of timed and untimed services) 30

## (undated) DEVICE — GLOVE BIOGEL INDICATOR 7.5 LF 41675

## (undated) DEVICE — GUIDE WIRE 1.25X150MM NON THRD  900.721

## (undated) DEVICE — DRILL BIT SYN QUICK COUPLING 2.5X180MM GOLD  310.23

## (undated) DEVICE — CAST PADDING 4" WEBRIL UNSTERILE

## (undated) DEVICE — GLOVE BIOGEL PI SZ 7.5 40875

## (undated) DEVICE — GLOVE BIOGEL PI SZ 8.0 40880

## (undated) DEVICE — PACK EXTREMITY SOP15EXFSD

## (undated) DEVICE — DRSG GAUZE 4X4" TRAY

## (undated) DEVICE — DRAPE STERI U 1015

## (undated) DEVICE — PREP CHLORAPREP 26ML TINTED ORANGE  260815

## (undated) DEVICE — CUFF TOURN 30IN STRL DISP 5921030235

## (undated) DEVICE — GLOVE ESTEEM BLUE W/NEU-THERA 8.5  2D73PB85

## (undated) DEVICE — DRAPE SHEET REV FOLD 3/4 9349

## (undated) DEVICE — DRAPE C-ARM

## (undated) DEVICE — DRSG ABDOMINAL 07 1/2X8" 7197D

## (undated) DEVICE — DRILL BIT QUICK COUPLING CANN 2.7MM 310.67

## (undated) DEVICE — DRILL BIT SYN QUICK COUPLING 3.5X110MM 310.35

## (undated) DEVICE — CAST PLASTER SPLINT 5X30" 7395

## (undated) DEVICE — SU ETHILON 3-0 PS-2 18" 1669H

## (undated) DEVICE — ESU GROUND PAD UNIVERSAL W/O CORD

## (undated) DEVICE — SU VICRYL 0 CT-2 27" UND J270H

## (undated) DEVICE — IMM LEG ELEVATOR 79-90191

## (undated) DEVICE — SU VICRYL 2-0 CT-2 27" UND J269H

## (undated) DEVICE — BNDG ELASTIC 6"X5YDS UNSTERILE 6611-60

## (undated) RX ORDER — FENTANYL CITRATE 50 UG/ML
INJECTION, SOLUTION INTRAMUSCULAR; INTRAVENOUS
Status: DISPENSED
Start: 2025-02-11

## (undated) RX ORDER — DEXAMETHASONE SODIUM PHOSPHATE 10 MG/ML
INJECTION, SOLUTION INTRAMUSCULAR; INTRAVENOUS
Status: DISPENSED
Start: 2025-02-11

## (undated) RX ORDER — LIDOCAINE HYDROCHLORIDE 10 MG/ML
INJECTION, SOLUTION EPIDURAL; INFILTRATION; INTRACAUDAL; PERINEURAL
Status: DISPENSED
Start: 2025-02-11

## (undated) RX ORDER — BUPIVACAINE HYDROCHLORIDE AND EPINEPHRINE 5; 5 MG/ML; UG/ML
INJECTION, SOLUTION EPIDURAL; INTRACAUDAL; PERINEURAL
Status: DISPENSED
Start: 2025-02-11

## (undated) RX ORDER — PROPOFOL 10 MG/ML
INJECTION, EMULSION INTRAVENOUS
Status: DISPENSED
Start: 2025-02-11

## (undated) RX ORDER — GLYCOPYRROLATE 0.2 MG/ML
INJECTION, SOLUTION INTRAMUSCULAR; INTRAVENOUS
Status: DISPENSED
Start: 2025-02-11